# Patient Record
Sex: FEMALE | Race: WHITE | NOT HISPANIC OR LATINO | Employment: FULL TIME | URBAN - NONMETROPOLITAN AREA
[De-identification: names, ages, dates, MRNs, and addresses within clinical notes are randomized per-mention and may not be internally consistent; named-entity substitution may affect disease eponyms.]

---

## 2017-05-25 ENCOUNTER — LAB (OUTPATIENT)
Dept: LAB | Facility: HOSPITAL | Age: 52
End: 2017-05-25
Attending: INTERNAL MEDICINE

## 2017-05-25 ENCOUNTER — TRANSCRIBE ORDERS (OUTPATIENT)
Dept: ADMINISTRATIVE | Facility: HOSPITAL | Age: 52
End: 2017-05-25

## 2017-05-25 DIAGNOSIS — I10 HYPERTENSION WITH GOAL BLOOD PRESSURE LESS THAN 140/80: Primary | ICD-10-CM

## 2017-05-25 DIAGNOSIS — I10 HYPERTENSION WITH GOAL BLOOD PRESSURE LESS THAN 140/80: ICD-10-CM

## 2017-05-25 LAB
25(OH)D3 SERPL-MCNC: 26 NG/ML
ALBUMIN SERPL-MCNC: 4.2 G/DL (ref 3.5–5)
ALBUMIN/GLOB SERPL: 1.1 G/DL (ref 1.5–2.5)
ALP SERPL-CCNC: 80 U/L (ref 35–104)
ALT SERPL W P-5'-P-CCNC: 19 U/L (ref 10–36)
ANION GAP SERPL CALCULATED.3IONS-SCNC: 4.3 MMOL/L (ref 3.6–11.2)
AST SERPL-CCNC: 27 U/L (ref 10–30)
BASOPHILS # BLD AUTO: 0.03 10*3/MM3 (ref 0–0.3)
BASOPHILS NFR BLD AUTO: 0.4 % (ref 0–2)
BILIRUB SERPL-MCNC: 0.4 MG/DL (ref 0.2–1.8)
BUN BLD-MCNC: 14 MG/DL (ref 7–21)
BUN/CREAT SERPL: 19.4 (ref 7–25)
CALCIUM SPEC-SCNC: 9.6 MG/DL (ref 7.7–10)
CHLORIDE SERPL-SCNC: 104 MMOL/L (ref 99–112)
CHOLEST SERPL-MCNC: 160 MG/DL (ref 0–200)
CO2 SERPL-SCNC: 31.7 MMOL/L (ref 24.3–31.9)
CREAT BLD-MCNC: 0.72 MG/DL (ref 0.43–1.29)
DEPRECATED RDW RBC AUTO: 46 FL (ref 37–54)
EOSINOPHIL # BLD AUTO: 0.22 10*3/MM3 (ref 0–0.7)
EOSINOPHIL NFR BLD AUTO: 3.2 % (ref 0–5)
ERYTHROCYTE [DISTWIDTH] IN BLOOD BY AUTOMATED COUNT: 13.6 % (ref 11.5–14.5)
FOLATE SERPL-MCNC: 9.24 NG/ML (ref 5.4–20)
GFR SERPL CREATININE-BSD FRML MDRD: 85 ML/MIN/1.73
GLOBULIN UR ELPH-MCNC: 4 GM/DL
GLUCOSE BLD-MCNC: 96 MG/DL (ref 70–110)
HCT VFR BLD AUTO: 39.9 % (ref 37–47)
HDLC SERPL-MCNC: 52 MG/DL (ref 60–100)
HGB BLD-MCNC: 12.8 G/DL (ref 12–16)
IMM GRANULOCYTES # BLD: 0.02 10*3/MM3 (ref 0–0.03)
IMM GRANULOCYTES NFR BLD: 0.3 % (ref 0–0.5)
LDLC SERPL CALC-MCNC: 81 MG/DL (ref 0–100)
LDLC/HDLC SERPL: 1.57 {RATIO}
LYMPHOCYTES # BLD AUTO: 1.43 10*3/MM3 (ref 1–3)
LYMPHOCYTES NFR BLD AUTO: 20.6 % (ref 21–51)
MCH RBC QN AUTO: 29.8 PG (ref 27–33)
MCHC RBC AUTO-ENTMCNC: 32.1 G/DL (ref 33–37)
MCV RBC AUTO: 93 FL (ref 80–94)
MONOCYTES # BLD AUTO: 0.4 10*3/MM3 (ref 0.1–0.9)
MONOCYTES NFR BLD AUTO: 5.8 % (ref 0–10)
NEUTROPHILS # BLD AUTO: 4.84 10*3/MM3 (ref 1.4–6.5)
NEUTROPHILS NFR BLD AUTO: 69.7 % (ref 30–70)
OSMOLALITY SERPL CALC.SUM OF ELEC: 279.7 MOSM/KG (ref 273–305)
PLATELET # BLD AUTO: 291 10*3/MM3 (ref 130–400)
PMV BLD AUTO: 10.8 FL (ref 6–10)
POTASSIUM BLD-SCNC: 3.7 MMOL/L (ref 3.5–5.3)
PROT SERPL-MCNC: 8.2 G/DL (ref 6–8)
RBC # BLD AUTO: 4.29 10*6/MM3 (ref 4.2–5.4)
SODIUM BLD-SCNC: 140 MMOL/L (ref 135–153)
TRIGL SERPL-MCNC: 133 MG/DL (ref 0–150)
TSH SERPL DL<=0.05 MIU/L-ACNC: 1.92 MIU/ML (ref 0.55–4.78)
VIT B12 BLD-MCNC: 257 PG/ML (ref 211–911)
VLDLC SERPL-MCNC: 26.6 MG/DL
WBC NRBC COR # BLD: 6.94 10*3/MM3 (ref 4.5–12.5)

## 2017-05-25 PROCEDURE — 82306 VITAMIN D 25 HYDROXY: CPT | Performed by: INTERNAL MEDICINE

## 2017-05-25 PROCEDURE — 84443 ASSAY THYROID STIM HORMONE: CPT | Performed by: INTERNAL MEDICINE

## 2017-05-25 PROCEDURE — 82607 VITAMIN B-12: CPT | Performed by: INTERNAL MEDICINE

## 2017-05-25 PROCEDURE — 80053 COMPREHEN METABOLIC PANEL: CPT | Performed by: INTERNAL MEDICINE

## 2017-05-25 PROCEDURE — 85025 COMPLETE CBC W/AUTO DIFF WBC: CPT | Performed by: INTERNAL MEDICINE

## 2017-05-25 PROCEDURE — 80061 LIPID PANEL: CPT | Performed by: INTERNAL MEDICINE

## 2017-05-25 PROCEDURE — 36415 COLL VENOUS BLD VENIPUNCTURE: CPT

## 2017-05-25 PROCEDURE — 82746 ASSAY OF FOLIC ACID SERUM: CPT | Performed by: INTERNAL MEDICINE

## 2017-11-13 ENCOUNTER — LAB (OUTPATIENT)
Dept: LAB | Facility: HOSPITAL | Age: 52
End: 2017-11-13
Attending: INTERNAL MEDICINE

## 2017-11-13 ENCOUNTER — TRANSCRIBE ORDERS (OUTPATIENT)
Dept: ADMINISTRATIVE | Facility: HOSPITAL | Age: 52
End: 2017-11-13

## 2017-11-13 DIAGNOSIS — E78.5 HYPERLIPIDEMIA, UNSPECIFIED HYPERLIPIDEMIA TYPE: Primary | ICD-10-CM

## 2017-11-13 DIAGNOSIS — E78.5 HYPERLIPIDEMIA, UNSPECIFIED HYPERLIPIDEMIA TYPE: ICD-10-CM

## 2017-11-13 LAB
ALBUMIN SERPL-MCNC: 4.2 G/DL (ref 3.5–5)
ALBUMIN/GLOB SERPL: 1.2 G/DL (ref 1.5–2.5)
ALP SERPL-CCNC: 76 U/L (ref 35–104)
ALT SERPL W P-5'-P-CCNC: 28 U/L (ref 10–36)
ANION GAP SERPL CALCULATED.3IONS-SCNC: 4.2 MMOL/L (ref 3.6–11.2)
AST SERPL-CCNC: 29 U/L (ref 10–30)
BILIRUB SERPL-MCNC: 0.5 MG/DL (ref 0.2–1.8)
BUN BLD-MCNC: 12 MG/DL (ref 7–21)
BUN/CREAT SERPL: 17.1 (ref 7–25)
CALCIUM SPEC-SCNC: 9.3 MG/DL (ref 7.7–10)
CHLORIDE SERPL-SCNC: 105 MMOL/L (ref 99–112)
CHOLEST SERPL-MCNC: 136 MG/DL (ref 0–200)
CO2 SERPL-SCNC: 30.8 MMOL/L (ref 24.3–31.9)
CREAT BLD-MCNC: 0.7 MG/DL (ref 0.43–1.29)
GFR SERPL CREATININE-BSD FRML MDRD: 88 ML/MIN/1.73
GLOBULIN UR ELPH-MCNC: 3.5 GM/DL
GLUCOSE BLD-MCNC: 87 MG/DL (ref 70–110)
HDLC SERPL-MCNC: 49 MG/DL (ref 60–100)
LDLC SERPL CALC-MCNC: 62 MG/DL (ref 0–100)
LDLC/HDLC SERPL: 1.27 {RATIO}
OSMOLALITY SERPL CALC.SUM OF ELEC: 278.5 MOSM/KG (ref 273–305)
POTASSIUM BLD-SCNC: 4.3 MMOL/L (ref 3.5–5.3)
PROT SERPL-MCNC: 7.7 G/DL (ref 6–8)
SODIUM BLD-SCNC: 140 MMOL/L (ref 135–153)
TRIGL SERPL-MCNC: 124 MG/DL (ref 0–150)
VLDLC SERPL-MCNC: 24.8 MG/DL

## 2017-11-13 PROCEDURE — 80053 COMPREHEN METABOLIC PANEL: CPT | Performed by: INTERNAL MEDICINE

## 2017-11-13 PROCEDURE — 36415 COLL VENOUS BLD VENIPUNCTURE: CPT

## 2017-11-13 PROCEDURE — 80061 LIPID PANEL: CPT | Performed by: INTERNAL MEDICINE

## 2018-04-30 ENCOUNTER — LAB (OUTPATIENT)
Dept: LAB | Facility: HOSPITAL | Age: 53
End: 2018-04-30
Attending: INTERNAL MEDICINE

## 2018-04-30 ENCOUNTER — TRANSCRIBE ORDERS (OUTPATIENT)
Dept: GENERAL RADIOLOGY | Facility: HOSPITAL | Age: 53
End: 2018-04-30

## 2018-04-30 DIAGNOSIS — I10 HYPERTENSION WITH GOAL BLOOD PRESSURE LESS THAN 140/80: Primary | ICD-10-CM

## 2018-04-30 DIAGNOSIS — R01.1 CARDIAC MURMUR: ICD-10-CM

## 2018-04-30 DIAGNOSIS — E55.9 VITAMIN D DEFICIENCY: ICD-10-CM

## 2018-04-30 DIAGNOSIS — E78.5 DYSLIPIDEMIA, GOAL LDL BELOW 130: ICD-10-CM

## 2018-04-30 DIAGNOSIS — M87.00 AVN (AVASCULAR NECROSIS OF BONE) (HCC): ICD-10-CM

## 2018-04-30 DIAGNOSIS — I10 HYPERTENSION WITH GOAL BLOOD PRESSURE LESS THAN 140/80: ICD-10-CM

## 2018-04-30 LAB
25(OH)D3 SERPL-MCNC: 40 NG/ML
ALBUMIN SERPL-MCNC: 4.1 G/DL (ref 3.5–5)
ALBUMIN/GLOB SERPL: 1.2 G/DL (ref 1.5–2.5)
ALP SERPL-CCNC: 74 U/L (ref 35–104)
ALT SERPL W P-5'-P-CCNC: 18 U/L (ref 10–36)
ANION GAP SERPL CALCULATED.3IONS-SCNC: 4.6 MMOL/L (ref 3.6–11.2)
AST SERPL-CCNC: 23 U/L (ref 10–30)
BILIRUB SERPL-MCNC: 0.5 MG/DL (ref 0.2–1.8)
BUN BLD-MCNC: 16 MG/DL (ref 7–21)
BUN/CREAT SERPL: 23.5 (ref 7–25)
CALCIUM SPEC-SCNC: 9.3 MG/DL (ref 7.7–10)
CHLORIDE SERPL-SCNC: 106 MMOL/L (ref 99–112)
CHOLEST SERPL-MCNC: 127 MG/DL (ref 0–200)
CK SERPL-CCNC: 149 U/L (ref 24–173)
CO2 SERPL-SCNC: 30.4 MMOL/L (ref 24.3–31.9)
CREAT BLD-MCNC: 0.68 MG/DL (ref 0.43–1.29)
GFR SERPL CREATININE-BSD FRML MDRD: 91 ML/MIN/1.73
GLOBULIN UR ELPH-MCNC: 3.3 GM/DL
GLUCOSE BLD-MCNC: 94 MG/DL (ref 70–110)
HDLC SERPL-MCNC: 46 MG/DL (ref 60–100)
LDLC SERPL CALC-MCNC: 54 MG/DL (ref 0–100)
LDLC/HDLC SERPL: 1.17 {RATIO}
OSMOLALITY SERPL CALC.SUM OF ELEC: 282.2 MOSM/KG (ref 273–305)
POTASSIUM BLD-SCNC: 4.1 MMOL/L (ref 3.5–5.3)
PROT SERPL-MCNC: 7.4 G/DL (ref 6–8)
SODIUM BLD-SCNC: 141 MMOL/L (ref 135–153)
TRIGL SERPL-MCNC: 136 MG/DL (ref 0–150)
VLDLC SERPL-MCNC: 27.2 MG/DL

## 2018-04-30 PROCEDURE — 82550 ASSAY OF CK (CPK): CPT

## 2018-04-30 PROCEDURE — 36415 COLL VENOUS BLD VENIPUNCTURE: CPT

## 2018-04-30 PROCEDURE — 80061 LIPID PANEL: CPT

## 2018-04-30 PROCEDURE — 80053 COMPREHEN METABOLIC PANEL: CPT

## 2018-04-30 PROCEDURE — 82306 VITAMIN D 25 HYDROXY: CPT

## 2018-05-21 ENCOUNTER — HOSPITAL ENCOUNTER (OUTPATIENT)
Dept: MAMMOGRAPHY | Facility: HOSPITAL | Age: 53
Discharge: HOME | End: 2018-05-21
Admitting: INTERNAL MEDICINE

## 2018-05-21 DIAGNOSIS — Z12.39 BREAST CANCER SCREENING: ICD-10-CM

## 2018-05-21 PROCEDURE — 77067 SCR MAMMO BI INCL CAD: CPT

## 2018-05-21 PROCEDURE — 77063 BREAST TOMOSYNTHESIS BI: CPT

## 2018-05-21 PROCEDURE — 77067 SCR MAMMO BI INCL CAD: CPT | Performed by: RADIOLOGY

## 2018-05-21 PROCEDURE — 77063 BREAST TOMOSYNTHESIS BI: CPT | Performed by: RADIOLOGY

## 2018-11-06 ENCOUNTER — LAB (OUTPATIENT)
Dept: LAB | Facility: HOSPITAL | Age: 53
End: 2018-11-06
Attending: INTERNAL MEDICINE

## 2018-11-06 ENCOUNTER — TRANSCRIBE ORDERS (OUTPATIENT)
Dept: ADMINISTRATIVE | Facility: HOSPITAL | Age: 53
End: 2018-11-06

## 2018-11-06 DIAGNOSIS — E78.5 HYPERLIPIDEMIA, UNSPECIFIED HYPERLIPIDEMIA TYPE: ICD-10-CM

## 2018-11-06 DIAGNOSIS — E53.8 VITAMIN B 12 DEFICIENCY: ICD-10-CM

## 2018-11-06 DIAGNOSIS — R53.83 MALAISE AND FATIGUE: ICD-10-CM

## 2018-11-06 DIAGNOSIS — I10 BENIGN HYPERTENSION: ICD-10-CM

## 2018-11-06 DIAGNOSIS — R53.81 MALAISE AND FATIGUE: ICD-10-CM

## 2018-11-06 DIAGNOSIS — I10 BENIGN HYPERTENSION: Primary | ICD-10-CM

## 2018-11-06 LAB
ALBUMIN SERPL-MCNC: 4.3 G/DL (ref 3.5–5)
ALBUMIN/GLOB SERPL: 1.3 G/DL (ref 1.5–2.5)
ALP SERPL-CCNC: 75 U/L (ref 35–104)
ALT SERPL W P-5'-P-CCNC: 21 U/L (ref 10–36)
ANION GAP SERPL CALCULATED.3IONS-SCNC: 5.1 MMOL/L (ref 3.6–11.2)
AST SERPL-CCNC: 21 U/L (ref 10–30)
BASOPHILS # BLD AUTO: 0.04 10*3/MM3 (ref 0–0.3)
BASOPHILS NFR BLD AUTO: 0.5 % (ref 0–2)
BILIRUB SERPL-MCNC: 0.5 MG/DL (ref 0.2–1.8)
BUN BLD-MCNC: 13 MG/DL (ref 7–21)
BUN/CREAT SERPL: 19.1 (ref 7–25)
CALCIUM SPEC-SCNC: 9.2 MG/DL (ref 7.7–10)
CHLORIDE SERPL-SCNC: 105 MMOL/L (ref 99–112)
CHOLEST SERPL-MCNC: 137 MG/DL (ref 0–200)
CO2 SERPL-SCNC: 28.9 MMOL/L (ref 24.3–31.9)
CREAT BLD-MCNC: 0.68 MG/DL (ref 0.43–1.29)
DEPRECATED RDW RBC AUTO: 45.1 FL (ref 37–54)
EOSINOPHIL # BLD AUTO: 0.32 10*3/MM3 (ref 0–0.7)
EOSINOPHIL NFR BLD AUTO: 3.8 % (ref 0–5)
ERYTHROCYTE [DISTWIDTH] IN BLOOD BY AUTOMATED COUNT: 13.7 % (ref 11.5–14.5)
FOLATE SERPL-MCNC: 14.67 NG/ML (ref 5.4–20)
GFR SERPL CREATININE-BSD FRML MDRD: 90 ML/MIN/1.73
GLOBULIN UR ELPH-MCNC: 3.3 GM/DL
GLUCOSE BLD-MCNC: 84 MG/DL (ref 70–110)
HCT VFR BLD AUTO: 41.5 % (ref 37–47)
HDLC SERPL-MCNC: 52 MG/DL (ref 60–100)
HGB BLD-MCNC: 13.1 G/DL (ref 12–16)
IMM GRANULOCYTES # BLD: 0.02 10*3/MM3 (ref 0–0.03)
IMM GRANULOCYTES NFR BLD: 0.2 % (ref 0–0.5)
LDLC SERPL CALC-MCNC: 60 MG/DL (ref 0–100)
LDLC/HDLC SERPL: 1.15 {RATIO}
LYMPHOCYTES # BLD AUTO: 1.48 10*3/MM3 (ref 1–3)
LYMPHOCYTES NFR BLD AUTO: 17.8 % (ref 21–51)
MCH RBC QN AUTO: 29.8 PG (ref 27–33)
MCHC RBC AUTO-ENTMCNC: 31.6 G/DL (ref 33–37)
MCV RBC AUTO: 94.3 FL (ref 80–94)
MONOCYTES # BLD AUTO: 0.35 10*3/MM3 (ref 0.1–0.9)
MONOCYTES NFR BLD AUTO: 4.2 % (ref 0–10)
NEUTROPHILS # BLD AUTO: 6.12 10*3/MM3 (ref 1.4–6.5)
NEUTROPHILS NFR BLD AUTO: 73.5 % (ref 30–70)
OSMOLALITY SERPL CALC.SUM OF ELEC: 276.8 MOSM/KG (ref 273–305)
PLATELET # BLD AUTO: 299 10*3/MM3 (ref 130–400)
PMV BLD AUTO: 10.3 FL (ref 6–10)
POTASSIUM BLD-SCNC: 3.7 MMOL/L (ref 3.5–5.3)
PROT SERPL-MCNC: 7.6 G/DL (ref 6–8)
RBC # BLD AUTO: 4.4 10*6/MM3 (ref 4.2–5.4)
SODIUM BLD-SCNC: 139 MMOL/L (ref 135–153)
TRIGL SERPL-MCNC: 127 MG/DL (ref 0–150)
TSH SERPL DL<=0.05 MIU/L-ACNC: 1.15 MIU/ML (ref 0.55–4.78)
VIT B12 BLD-MCNC: 331 PG/ML (ref 211–911)
VLDLC SERPL-MCNC: 25.4 MG/DL
WBC NRBC COR # BLD: 8.33 10*3/MM3 (ref 4.5–12.5)

## 2018-11-06 PROCEDURE — 80061 LIPID PANEL: CPT

## 2018-11-06 PROCEDURE — 36415 COLL VENOUS BLD VENIPUNCTURE: CPT

## 2018-11-06 PROCEDURE — 85025 COMPLETE CBC W/AUTO DIFF WBC: CPT

## 2018-11-06 PROCEDURE — 84443 ASSAY THYROID STIM HORMONE: CPT

## 2018-11-06 PROCEDURE — 82607 VITAMIN B-12: CPT

## 2018-11-06 PROCEDURE — 80053 COMPREHEN METABOLIC PANEL: CPT

## 2018-11-06 PROCEDURE — 82746 ASSAY OF FOLIC ACID SERUM: CPT

## 2019-12-16 ENCOUNTER — HOSPITAL ENCOUNTER (OUTPATIENT)
Dept: MAMMOGRAPHY | Facility: HOSPITAL | Age: 54
Discharge: HOME | End: 2019-12-16
Admitting: INTERNAL MEDICINE

## 2019-12-16 DIAGNOSIS — Z12.31 VISIT FOR SCREENING MAMMOGRAM: ICD-10-CM

## 2019-12-16 PROCEDURE — 77067 SCR MAMMO BI INCL CAD: CPT

## 2019-12-16 PROCEDURE — 77067 SCR MAMMO BI INCL CAD: CPT | Performed by: RADIOLOGY

## 2019-12-16 PROCEDURE — 77063 BREAST TOMOSYNTHESIS BI: CPT

## 2019-12-16 PROCEDURE — 77063 BREAST TOMOSYNTHESIS BI: CPT | Performed by: RADIOLOGY

## 2020-01-14 ENCOUNTER — HOSPITAL ENCOUNTER (OUTPATIENT)
Dept: MRI IMAGING | Facility: HOSPITAL | Age: 55
Discharge: HOME | End: 2020-01-14

## 2020-01-14 ENCOUNTER — TRANSCRIBE ORDERS (OUTPATIENT)
Dept: ADMINISTRATIVE | Facility: HOSPITAL | Age: 55
End: 2020-01-14

## 2020-01-14 DIAGNOSIS — M25.551 RIGHT HIP PAIN: ICD-10-CM

## 2020-01-14 DIAGNOSIS — M25.551 RIGHT HIP PAIN: Primary | ICD-10-CM

## 2020-02-04 ENCOUNTER — HOSPITAL ENCOUNTER (OUTPATIENT)
Dept: GENERAL RADIOLOGY | Facility: HOSPITAL | Age: 55
Discharge: HOME | End: 2020-02-04
Admitting: ORTHOPAEDIC SURGERY

## 2020-02-04 ENCOUNTER — APPOINTMENT (OUTPATIENT)
Dept: PREADMISSION TESTING | Facility: HOSPITAL | Age: 55
End: 2020-02-04

## 2020-02-04 VITALS — HEIGHT: 67 IN | BODY MASS INDEX: 45.99 KG/M2 | WEIGHT: 293 LBS

## 2020-02-04 LAB
ABO GROUP BLD: NORMAL
ALBUMIN SERPL-MCNC: 4.5 G/DL (ref 3.5–5.2)
ALBUMIN/GLOB SERPL: 1.3 G/DL
ALP SERPL-CCNC: 98 U/L (ref 39–117)
ALT SERPL W P-5'-P-CCNC: 17 U/L (ref 1–33)
ANION GAP SERPL CALCULATED.3IONS-SCNC: 14 MMOL/L (ref 5–15)
AST SERPL-CCNC: 19 U/L (ref 1–32)
BACTERIA UR QL AUTO: NORMAL /HPF
BASOPHILS # BLD AUTO: 0.06 10*3/MM3 (ref 0–0.2)
BASOPHILS NFR BLD AUTO: 0.5 % (ref 0–1.5)
BILIRUB SERPL-MCNC: 0.4 MG/DL (ref 0.2–1.2)
BILIRUB UR QL STRIP: NEGATIVE
BLD GP AB SCN SERPL QL: NEGATIVE
BUN BLD-MCNC: 20 MG/DL (ref 6–20)
BUN/CREAT SERPL: 25.6 (ref 7–25)
CALCIUM SPEC-SCNC: 9.4 MG/DL (ref 8.6–10.5)
CHLORIDE SERPL-SCNC: 99 MMOL/L (ref 98–107)
CLARITY UR: CLEAR
CO2 SERPL-SCNC: 27 MMOL/L (ref 22–29)
COLOR UR: YELLOW
CREAT BLD-MCNC: 0.78 MG/DL (ref 0.57–1)
CRP SERPL-MCNC: 0.7 MG/DL (ref 0–0.5)
DEPRECATED RDW RBC AUTO: 48.9 FL (ref 37–54)
EOSINOPHIL # BLD AUTO: 0.27 10*3/MM3 (ref 0–0.4)
EOSINOPHIL NFR BLD AUTO: 2.1 % (ref 0.3–6.2)
ERYTHROCYTE [DISTWIDTH] IN BLOOD BY AUTOMATED COUNT: 13.4 % (ref 12.3–15.4)
ERYTHROCYTE [SEDIMENTATION RATE] IN BLOOD: 36 MM/HR (ref 0–30)
GFR SERPL CREATININE-BSD FRML MDRD: 77 ML/MIN/1.73
GLOBULIN UR ELPH-MCNC: 3.6 GM/DL
GLUCOSE BLD-MCNC: 89 MG/DL (ref 65–99)
GLUCOSE UR STRIP-MCNC: NEGATIVE MG/DL
HBA1C MFR BLD: 5.7 % (ref 4.8–5.6)
HCT VFR BLD AUTO: 39.4 % (ref 34–46.6)
HGB BLD-MCNC: 12.7 G/DL (ref 12–15.9)
HGB UR QL STRIP.AUTO: NEGATIVE
HYALINE CASTS UR QL AUTO: NORMAL /LPF
IMM GRANULOCYTES # BLD AUTO: 0.07 10*3/MM3 (ref 0–0.05)
IMM GRANULOCYTES NFR BLD AUTO: 0.5 % (ref 0–0.5)
KETONES UR QL STRIP: NEGATIVE
LEUKOCYTE ESTERASE UR QL STRIP.AUTO: NEGATIVE
LYMPHOCYTES # BLD AUTO: 1.93 10*3/MM3 (ref 0.7–3.1)
LYMPHOCYTES NFR BLD AUTO: 15.2 % (ref 19.6–45.3)
MCH RBC QN AUTO: 31.4 PG (ref 26.6–33)
MCHC RBC AUTO-ENTMCNC: 32.2 G/DL (ref 31.5–35.7)
MCV RBC AUTO: 97.5 FL (ref 79–97)
MONOCYTES # BLD AUTO: 0.71 10*3/MM3 (ref 0.1–0.9)
MONOCYTES NFR BLD AUTO: 5.6 % (ref 5–12)
NEUTROPHILS # BLD AUTO: 9.69 10*3/MM3 (ref 1.7–7)
NEUTROPHILS NFR BLD AUTO: 76.1 % (ref 42.7–76)
NITRITE UR QL STRIP: NEGATIVE
NRBC BLD AUTO-RTO: 0 /100 WBC (ref 0–0.2)
PH UR STRIP.AUTO: 5.5 [PH] (ref 5–8)
PLATELET # BLD AUTO: 357 10*3/MM3 (ref 140–450)
PMV BLD AUTO: 9.8 FL (ref 6–12)
POTASSIUM BLD-SCNC: 3.8 MMOL/L (ref 3.5–5.2)
PROT SERPL-MCNC: 8.1 G/DL (ref 6–8.5)
PROT UR QL STRIP: NEGATIVE
RBC # BLD AUTO: 4.04 10*6/MM3 (ref 3.77–5.28)
RBC # UR: NORMAL /HPF
REF LAB TEST METHOD: NORMAL
RH BLD: POSITIVE
SODIUM BLD-SCNC: 140 MMOL/L (ref 136–145)
SP GR UR STRIP: 1.02 (ref 1–1.03)
SQUAMOUS #/AREA URNS HPF: NORMAL /HPF
UROBILINOGEN UR QL STRIP: NORMAL
WBC NRBC COR # BLD: 12.73 10*3/MM3 (ref 3.4–10.8)
WBC UR QL AUTO: NORMAL /HPF

## 2020-02-04 PROCEDURE — 83036 HEMOGLOBIN GLYCOSYLATED A1C: CPT | Performed by: ORTHOPAEDIC SURGERY

## 2020-02-04 PROCEDURE — 36415 COLL VENOUS BLD VENIPUNCTURE: CPT

## 2020-02-04 PROCEDURE — 86850 RBC ANTIBODY SCREEN: CPT | Performed by: ORTHOPAEDIC SURGERY

## 2020-02-04 PROCEDURE — 86140 C-REACTIVE PROTEIN: CPT | Performed by: ORTHOPAEDIC SURGERY

## 2020-02-04 PROCEDURE — 71046 X-RAY EXAM CHEST 2 VIEWS: CPT

## 2020-02-04 PROCEDURE — 86901 BLOOD TYPING SEROLOGIC RH(D): CPT | Performed by: ORTHOPAEDIC SURGERY

## 2020-02-04 PROCEDURE — 81001 URINALYSIS AUTO W/SCOPE: CPT | Performed by: ORTHOPAEDIC SURGERY

## 2020-02-04 PROCEDURE — 80053 COMPREHEN METABOLIC PANEL: CPT | Performed by: ORTHOPAEDIC SURGERY

## 2020-02-04 PROCEDURE — 93005 ELECTROCARDIOGRAM TRACING: CPT

## 2020-02-04 PROCEDURE — 85652 RBC SED RATE AUTOMATED: CPT | Performed by: ORTHOPAEDIC SURGERY

## 2020-02-04 PROCEDURE — 93010 ELECTROCARDIOGRAM REPORT: CPT | Performed by: INTERNAL MEDICINE

## 2020-02-04 PROCEDURE — 85025 COMPLETE CBC W/AUTO DIFF WBC: CPT | Performed by: ORTHOPAEDIC SURGERY

## 2020-02-04 PROCEDURE — 86900 BLOOD TYPING SEROLOGIC ABO: CPT | Performed by: ORTHOPAEDIC SURGERY

## 2020-02-04 RX ORDER — ACETAMINOPHEN 500 MG
500 TABLET ORAL EVERY 6 HOURS PRN
COMMUNITY

## 2020-02-04 RX ORDER — GABAPENTIN 400 MG/1
400 CAPSULE ORAL AS NEEDED
COMMUNITY

## 2020-02-04 RX ORDER — ASPIRIN 81 MG/1
81 TABLET ORAL DAILY
Status: ON HOLD | COMMUNITY
End: 2020-02-13 | Stop reason: SDUPTHER

## 2020-02-04 RX ORDER — HYDROCODONE BITARTRATE AND ACETAMINOPHEN 7.5; 325 MG/1; MG/1
1 TABLET ORAL EVERY 6 HOURS PRN
COMMUNITY
End: 2020-02-13 | Stop reason: HOSPADM

## 2020-02-04 ASSESSMENT — HOOS JR
HOOS JR SCORE: 36.363
HOOS JR SCORE: 17

## 2020-02-04 NOTE — PAT
The following information and instructions were given:    Do not eat, drink, smoke or chew gum after midnight the night before surgery. This also includes no mints.  Take all routine, prescribed medications including heart and blood pressure medicines with a sip of water unless otherwise instructed by your physician.   Do NOT take diabetic medication unless instructed by your physician.    DO NOT shave for two days before your procedure.  Do not wear makeup.      DO NOT wear fingernail polish (gel/regular) and/or acrylic/artificial nails on the day of surgery.   If you have had a recent manicure and would rather not remove polish or artificial nails, then the minimum requirement is that the polish/artificial nails must be removed from the middle finger on each hand.      If you are having surgery/procedure on an upper extremity, then fingernail polish/artificial fingernails must be removed for surgery.  NO EXCEPTIONS.      If you are having surgery/procedure on a lower extremity, then toenail polish on both extremities must be removed for surgery.  NO EXCEPTIONS.    Remove all jewelry (advise to go to jeweler if unable to remove).  Jewelry, especially rings, can no longer be taped for surgery.    Leave anything you consider valuable at home.    Leave your suitcase in the car until after your surgery.    Bring the following with you the day of your procedure (when applicable)       -picture ID and insurance cards       -Co-pay/deductible required by insurance       -Medications in the original bottles (not a list) including all over-the-counter medications if not brought to PAT       -Copy of advance directive, living will or power of  documents if not brought to PAT       -CPAP or BIPAP mask and tubing (do not bring machine)       -Skin prep instruction(s) sheet       -PAT Pass    Education booklet, brochure, handout or binder related to procedure given to patient.  Education booklet also includes general  information related to their recovery that mentions signs and symptoms of infection and when to call the doctor.    When applicable, an ERAS booklet was given to patient.    Pain Control After Surgery handout given to patient.    Respirex use (handout given to patient) and pneumonia prevention education provided.    Signs and Symptoms of infection were discussed with patient in Pre Admission testing.  Patient instructed to call their doctor if any of the following symptoms are noted during recovery:  fever of 100.4 F or higher, incision that is warm or has increasing bleeding, redness, or drainage.    DVT Prevention instructions given verbally during Pre Admission Testing appointment that stressed the importance of ambulation to improve blood circulation.  Also encouraged patient to perform foot exercises when in bed and that the application of a sequential device might be applied to lower extremities to improve circulation.      Please apply Chlorhexadine wipes to surgical area (if instructed) the night before procedure and the AM of procedure and document date/time of applications on skin prep instruction sheet.        Patient instructed to drink 20 ounces (or until full) of Gatorade and it needs to be completed 1 hour before given arrival time for procedure (NO RED Gatorade)    Patient verbalized understanding.        PATIENT DID NOT ATTEND JOINT REPLACEMENT CLASS

## 2020-02-11 ENCOUNTER — ANESTHESIA EVENT (OUTPATIENT)
Dept: PERIOP | Facility: HOSPITAL | Age: 55
End: 2020-02-11

## 2020-02-11 RX ORDER — FAMOTIDINE 10 MG/ML
20 INJECTION, SOLUTION INTRAVENOUS ONCE
Status: CANCELLED | OUTPATIENT
Start: 2020-02-11 | End: 2020-02-11

## 2020-02-12 ENCOUNTER — HOSPITAL ENCOUNTER (INPATIENT)
Facility: HOSPITAL | Age: 55
LOS: 1 days | Discharge: HOME | End: 2020-02-13
Attending: ORTHOPAEDIC SURGERY | Admitting: ORTHOPAEDIC SURGERY

## 2020-02-12 ENCOUNTER — APPOINTMENT (OUTPATIENT)
Dept: GENERAL RADIOLOGY | Facility: HOSPITAL | Age: 55
End: 2020-02-12

## 2020-02-12 ENCOUNTER — ANESTHESIA (OUTPATIENT)
Dept: PERIOP | Facility: HOSPITAL | Age: 55
End: 2020-02-12

## 2020-02-12 DIAGNOSIS — Z78.9 IMPAIRED MOBILITY AND ADLS: Primary | ICD-10-CM

## 2020-02-12 DIAGNOSIS — M16.11 ARTHRITIS OF RIGHT HIP: ICD-10-CM

## 2020-02-12 DIAGNOSIS — Z74.09 IMPAIRED FUNCTIONAL MOBILITY, BALANCE, GAIT, AND ENDURANCE: ICD-10-CM

## 2020-02-12 DIAGNOSIS — Z74.09 IMPAIRED MOBILITY AND ADLS: Primary | ICD-10-CM

## 2020-02-12 DIAGNOSIS — Z96.641 STATUS POST TOTAL REPLACEMENT OF RIGHT HIP: ICD-10-CM

## 2020-02-12 PROBLEM — R73.03 PREDIABETES: Status: ACTIVE | Noted: 2020-02-12

## 2020-02-12 PROBLEM — E66.9 OBESITY: Status: ACTIVE | Noted: 2020-02-12

## 2020-02-12 PROBLEM — I10 HYPERTENSION: Status: ACTIVE | Noted: 2020-02-12

## 2020-02-12 PROBLEM — E78.5 HYPERLIPIDEMIA: Status: ACTIVE | Noted: 2020-02-12

## 2020-02-12 LAB
ABO GROUP BLD: NORMAL
B-HCG UR QL: NEGATIVE
BLD GP AB SCN SERPL QL: NEGATIVE
GLUCOSE BLDC GLUCOMTR-MCNC: 135 MG/DL (ref 70–130)
GLUCOSE BLDC GLUCOMTR-MCNC: 154 MG/DL (ref 70–130)
INTERNAL NEGATIVE CONTROL: NORMAL
INTERNAL POSITIVE CONTROL: REACTIVE
Lab: NORMAL
POTASSIUM BLD-SCNC: 3.6 MMOL/L (ref 3.5–5.2)
RH BLD: POSITIVE
T&S EXPIRATION DATE: NORMAL

## 2020-02-12 PROCEDURE — 84132 ASSAY OF SERUM POTASSIUM: CPT | Performed by: ANESTHESIOLOGY

## 2020-02-12 PROCEDURE — C1776 JOINT DEVICE (IMPLANTABLE): HCPCS | Performed by: ORTHOPAEDIC SURGERY

## 2020-02-12 PROCEDURE — 97162 PT EVAL MOD COMPLEX 30 MIN: CPT

## 2020-02-12 PROCEDURE — 76000 FLUOROSCOPY <1 HR PHYS/QHP: CPT

## 2020-02-12 PROCEDURE — 25010000002 ONDANSETRON PER 1 MG: Performed by: NURSE PRACTITIONER

## 2020-02-12 PROCEDURE — 86901 BLOOD TYPING SEROLOGIC RH(D): CPT | Performed by: ORTHOPAEDIC SURGERY

## 2020-02-12 PROCEDURE — 82962 GLUCOSE BLOOD TEST: CPT

## 2020-02-12 PROCEDURE — 97116 GAIT TRAINING THERAPY: CPT

## 2020-02-12 PROCEDURE — C1713 ANCHOR/SCREW BN/BN,TIS/BN: HCPCS | Performed by: ORTHOPAEDIC SURGERY

## 2020-02-12 PROCEDURE — 0SR906A REPLACEMENT OF RIGHT HIP JOINT WITH OXIDIZED ZIRCONIUM ON POLYETHYLENE SYNTHETIC SUBSTITUTE, UNCEMENTED, OPEN APPROACH: ICD-10-PCS | Performed by: ORTHOPAEDIC SURGERY

## 2020-02-12 PROCEDURE — 25010000002 HYDROMORPHONE PER 4 MG: Performed by: ANESTHESIOLOGY

## 2020-02-12 PROCEDURE — 25010000002 ONDANSETRON PER 1 MG: Performed by: NURSE ANESTHETIST, CERTIFIED REGISTERED

## 2020-02-12 PROCEDURE — 25010000002 PROCHLORPERAZINE 10 MG/2ML SOLUTION: Performed by: INTERNAL MEDICINE

## 2020-02-12 PROCEDURE — 81025 URINE PREGNANCY TEST: CPT | Performed by: ANESTHESIOLOGY

## 2020-02-12 PROCEDURE — 25010000002 PROPOFOL 10 MG/ML EMULSION: Performed by: NURSE ANESTHETIST, CERTIFIED REGISTERED

## 2020-02-12 PROCEDURE — 86923 COMPATIBILITY TEST ELECTRIC: CPT

## 2020-02-12 PROCEDURE — 86850 RBC ANTIBODY SCREEN: CPT | Performed by: ORTHOPAEDIC SURGERY

## 2020-02-12 PROCEDURE — 25010000002 FENTANYL CITRATE (PF) 100 MCG/2ML SOLUTION: Performed by: NURSE ANESTHETIST, CERTIFIED REGISTERED

## 2020-02-12 PROCEDURE — 25010000002 CEFAZOLIN PER 500 MG: Performed by: ORTHOPAEDIC SURGERY

## 2020-02-12 PROCEDURE — 73502 X-RAY EXAM HIP UNI 2-3 VIEWS: CPT

## 2020-02-12 PROCEDURE — 63710000001 INSULIN LISPRO (HUMAN) PER 5 UNITS: Performed by: NURSE PRACTITIONER

## 2020-02-12 PROCEDURE — 86900 BLOOD TYPING SEROLOGIC ABO: CPT | Performed by: ORTHOPAEDIC SURGERY

## 2020-02-12 DEVICE — IMPLANTABLE DEVICE: Type: IMPLANTABLE DEVICE | Site: HIP | Status: FUNCTIONAL

## 2020-02-12 DEVICE — REFLECTION SPHERICAL HEAD SCREW 20MM
Type: IMPLANTABLE DEVICE | Site: HIP | Status: FUNCTIONAL
Brand: REFLECTION

## 2020-02-12 DEVICE — POLARSTEM COLLAR STANDARD                                    NON-CEMENTED WITH TI/HA 3
Type: IMPLANTABLE DEVICE | Site: HIP | Status: FUNCTIONAL
Brand: POLARSTEM

## 2020-02-12 DEVICE — R3 3 HOLE ACETABULAR SHELL 52MM
Type: IMPLANTABLE DEVICE | Site: HIP | Status: FUNCTIONAL
Brand: R3 ACETABULAR

## 2020-02-12 DEVICE — R3 0 DEGREE XLPE ACETABULAR LINER                                    36MM INNER DIAMETER X OUTER DIAMETER 52MM
Type: IMPLANTABLE DEVICE | Site: HIP | Status: FUNCTIONAL
Brand: R3

## 2020-02-12 DEVICE — OXINIUM FEMORAL HEAD 12/14 TAPER                                    36 MM +0
Type: IMPLANTABLE DEVICE | Site: HIP | Status: FUNCTIONAL
Brand: OXINIUM

## 2020-02-12 RX ORDER — CEFAZOLIN SODIUM IN 0.9 % NACL 3 G/100 ML
3 INTRAVENOUS SOLUTION, PIGGYBACK (ML) INTRAVENOUS ONCE
Status: COMPLETED | OUTPATIENT
Start: 2020-02-12 | End: 2020-02-12

## 2020-02-12 RX ORDER — VALSARTAN 160 MG/1
320 TABLET ORAL
Status: DISCONTINUED | OUTPATIENT
Start: 2020-02-12 | End: 2020-02-13 | Stop reason: HOSPADM

## 2020-02-12 RX ORDER — LIDOCAINE HYDROCHLORIDE 10 MG/ML
0.5 INJECTION, SOLUTION EPIDURAL; INFILTRATION; INTRACAUDAL; PERINEURAL ONCE AS NEEDED
Status: COMPLETED | OUTPATIENT
Start: 2020-02-12 | End: 2020-02-12

## 2020-02-12 RX ORDER — SODIUM CHLORIDE, SODIUM LACTATE, POTASSIUM CHLORIDE, CALCIUM CHLORIDE 600; 310; 30; 20 MG/100ML; MG/100ML; MG/100ML; MG/100ML
9 INJECTION, SOLUTION INTRAVENOUS CONTINUOUS
Status: DISCONTINUED | OUTPATIENT
Start: 2020-02-12 | End: 2020-02-13 | Stop reason: HOSPADM

## 2020-02-12 RX ORDER — HYDROMORPHONE HYDROCHLORIDE 1 MG/ML
0.5 INJECTION, SOLUTION INTRAMUSCULAR; INTRAVENOUS; SUBCUTANEOUS
Status: COMPLETED | OUTPATIENT
Start: 2020-02-12 | End: 2020-02-12

## 2020-02-12 RX ORDER — DEXTROSE MONOHYDRATE 25 G/50ML
25 INJECTION, SOLUTION INTRAVENOUS
Status: DISCONTINUED | OUTPATIENT
Start: 2020-02-12 | End: 2020-02-13 | Stop reason: HOSPADM

## 2020-02-12 RX ORDER — BISACODYL 10 MG
10 SUPPOSITORY, RECTAL RECTAL DAILY PRN
Status: DISCONTINUED | OUTPATIENT
Start: 2020-02-12 | End: 2020-02-13 | Stop reason: HOSPADM

## 2020-02-12 RX ORDER — PREGABALIN 75 MG/1
75 CAPSULE ORAL ONCE
Status: COMPLETED | OUTPATIENT
Start: 2020-02-12 | End: 2020-02-12

## 2020-02-12 RX ORDER — CEFAZOLIN SODIUM IN 0.9 % NACL 3 G/100 ML
3 INTRAVENOUS SOLUTION, PIGGYBACK (ML) INTRAVENOUS EVERY 8 HOURS
Status: COMPLETED | OUTPATIENT
Start: 2020-02-12 | End: 2020-02-13

## 2020-02-12 RX ORDER — FAMOTIDINE 20 MG/1
20 TABLET, FILM COATED ORAL ONCE
Status: COMPLETED | OUTPATIENT
Start: 2020-02-12 | End: 2020-02-12

## 2020-02-12 RX ORDER — BUPIVACAINE HYDROCHLORIDE 5 MG/ML
INJECTION, SOLUTION PERINEURAL
Status: COMPLETED | OUTPATIENT
Start: 2020-02-12 | End: 2020-02-12

## 2020-02-12 RX ORDER — HYDROCODONE BITARTRATE AND ACETAMINOPHEN 5; 325 MG/1; MG/1
1 TABLET ORAL EVERY 4 HOURS PRN
Status: DISCONTINUED | OUTPATIENT
Start: 2020-02-12 | End: 2020-02-13 | Stop reason: HOSPADM

## 2020-02-12 RX ORDER — LABETALOL HYDROCHLORIDE 5 MG/ML
10 INJECTION, SOLUTION INTRAVENOUS EVERY 4 HOURS PRN
Status: DISCONTINUED | OUTPATIENT
Start: 2020-02-12 | End: 2020-02-13 | Stop reason: HOSPADM

## 2020-02-12 RX ORDER — PROCHLORPERAZINE 25 MG
25 SUPPOSITORY, RECTAL RECTAL EVERY 12 HOURS PRN
Status: DISCONTINUED | OUTPATIENT
Start: 2020-02-12 | End: 2020-02-13 | Stop reason: HOSPADM

## 2020-02-12 RX ORDER — NICOTINE POLACRILEX 4 MG
15 LOZENGE BUCCAL
Status: DISCONTINUED | OUTPATIENT
Start: 2020-02-12 | End: 2020-02-13 | Stop reason: HOSPADM

## 2020-02-12 RX ORDER — PROMETHAZINE HYDROCHLORIDE 12.5 MG/1
12.5 TABLET ORAL EVERY 6 HOURS PRN
Status: DISCONTINUED | OUTPATIENT
Start: 2020-02-12 | End: 2020-02-13 | Stop reason: HOSPADM

## 2020-02-12 RX ORDER — SODIUM CHLORIDE 9 MG/ML
150 INJECTION, SOLUTION INTRAVENOUS CONTINUOUS
Status: DISCONTINUED | OUTPATIENT
Start: 2020-02-12 | End: 2020-02-13 | Stop reason: HOSPADM

## 2020-02-12 RX ORDER — ASPIRIN 325 MG
325 TABLET ORAL DAILY
Status: DISCONTINUED | OUTPATIENT
Start: 2020-02-13 | End: 2020-02-13 | Stop reason: HOSPADM

## 2020-02-12 RX ORDER — ONDANSETRON 2 MG/ML
INJECTION INTRAMUSCULAR; INTRAVENOUS AS NEEDED
Status: DISCONTINUED | OUTPATIENT
Start: 2020-02-12 | End: 2020-02-12 | Stop reason: SURG

## 2020-02-12 RX ORDER — FENTANYL CITRATE 50 UG/ML
50 INJECTION, SOLUTION INTRAMUSCULAR; INTRAVENOUS
Status: DISCONTINUED | OUTPATIENT
Start: 2020-02-12 | End: 2020-02-12 | Stop reason: HOSPADM

## 2020-02-12 RX ORDER — PROPOFOL 10 MG/ML
VIAL (ML) INTRAVENOUS AS NEEDED
Status: DISCONTINUED | OUTPATIENT
Start: 2020-02-12 | End: 2020-02-12 | Stop reason: SURG

## 2020-02-12 RX ORDER — BISACODYL 5 MG/1
10 TABLET, DELAYED RELEASE ORAL DAILY PRN
Status: DISCONTINUED | OUTPATIENT
Start: 2020-02-12 | End: 2020-02-13 | Stop reason: HOSPADM

## 2020-02-12 RX ORDER — HYDROMORPHONE HYDROCHLORIDE 1 MG/ML
0.5 INJECTION, SOLUTION INTRAMUSCULAR; INTRAVENOUS; SUBCUTANEOUS
Status: DISCONTINUED | OUTPATIENT
Start: 2020-02-12 | End: 2020-02-13 | Stop reason: HOSPADM

## 2020-02-12 RX ORDER — SODIUM CHLORIDE 0.9 % (FLUSH) 0.9 %
10 SYRINGE (ML) INJECTION EVERY 12 HOURS SCHEDULED
Status: DISCONTINUED | OUTPATIENT
Start: 2020-02-12 | End: 2020-02-12 | Stop reason: HOSPADM

## 2020-02-12 RX ORDER — KETOROLAC TROMETHAMINE 15 MG/ML
15 INJECTION, SOLUTION INTRAMUSCULAR; INTRAVENOUS EVERY 6 HOURS PRN
Status: DISCONTINUED | OUTPATIENT
Start: 2020-02-12 | End: 2020-02-13 | Stop reason: HOSPADM

## 2020-02-12 RX ORDER — AMLODIPINE BESYLATE 5 MG/1
5 TABLET ORAL DAILY
Status: DISCONTINUED | OUTPATIENT
Start: 2020-02-13 | End: 2020-02-13 | Stop reason: HOSPADM

## 2020-02-12 RX ORDER — ROSUVASTATIN CALCIUM 20 MG/1
20 TABLET, COATED ORAL DAILY
Status: DISCONTINUED | OUTPATIENT
Start: 2020-02-13 | End: 2020-02-13 | Stop reason: HOSPADM

## 2020-02-12 RX ORDER — SODIUM CHLORIDE 0.9 % (FLUSH) 0.9 %
10 SYRINGE (ML) INJECTION AS NEEDED
Status: DISCONTINUED | OUTPATIENT
Start: 2020-02-12 | End: 2020-02-12 | Stop reason: HOSPADM

## 2020-02-12 RX ORDER — DOCUSATE SODIUM 100 MG/1
100 CAPSULE, LIQUID FILLED ORAL 2 TIMES DAILY
Status: DISCONTINUED | OUTPATIENT
Start: 2020-02-12 | End: 2020-02-13 | Stop reason: HOSPADM

## 2020-02-12 RX ORDER — PROCHLORPERAZINE MALEATE 5 MG/1
5 TABLET ORAL EVERY 6 HOURS PRN
Status: DISCONTINUED | OUTPATIENT
Start: 2020-02-12 | End: 2020-02-13 | Stop reason: HOSPADM

## 2020-02-12 RX ORDER — LIDOCAINE HYDROCHLORIDE 10 MG/ML
INJECTION, SOLUTION EPIDURAL; INFILTRATION; INTRACAUDAL; PERINEURAL AS NEEDED
Status: DISCONTINUED | OUTPATIENT
Start: 2020-02-12 | End: 2020-02-12 | Stop reason: SURG

## 2020-02-12 RX ORDER — FENTANYL CITRATE 50 UG/ML
INJECTION, SOLUTION INTRAMUSCULAR; INTRAVENOUS AS NEEDED
Status: DISCONTINUED | OUTPATIENT
Start: 2020-02-12 | End: 2020-02-12 | Stop reason: SURG

## 2020-02-12 RX ORDER — NALOXONE HCL 0.4 MG/ML
0.1 VIAL (ML) INJECTION
Status: DISCONTINUED | OUTPATIENT
Start: 2020-02-12 | End: 2020-02-13 | Stop reason: HOSPADM

## 2020-02-12 RX ORDER — BUPIVACAINE HYDROCHLORIDE AND EPINEPHRINE 2.5; 5 MG/ML; UG/ML
INJECTION, SOLUTION EPIDURAL; INFILTRATION; INTRACAUDAL; PERINEURAL AS NEEDED
Status: DISCONTINUED | OUTPATIENT
Start: 2020-02-12 | End: 2020-02-12 | Stop reason: HOSPADM

## 2020-02-12 RX ORDER — PROCHLORPERAZINE EDISYLATE 5 MG/ML
5 INJECTION INTRAMUSCULAR; INTRAVENOUS EVERY 6 HOURS PRN
Status: DISCONTINUED | OUTPATIENT
Start: 2020-02-12 | End: 2020-02-13 | Stop reason: HOSPADM

## 2020-02-12 RX ORDER — ONDANSETRON 2 MG/ML
4 INJECTION INTRAMUSCULAR; INTRAVENOUS EVERY 6 HOURS PRN
Status: DISCONTINUED | OUTPATIENT
Start: 2020-02-12 | End: 2020-02-13 | Stop reason: HOSPADM

## 2020-02-12 RX ADMIN — LIDOCAINE HYDROCHLORIDE 50 MG: 10 INJECTION, SOLUTION EPIDURAL; INFILTRATION; INTRACAUDAL; PERINEURAL at 11:10

## 2020-02-12 RX ADMIN — Medication 3 G: at 11:04

## 2020-02-12 RX ADMIN — PROPOFOL 120 MCG/KG/MIN: 10 INJECTION, EMULSION INTRAVENOUS at 11:38

## 2020-02-12 RX ADMIN — HYDROCODONE BITARTRATE AND ACETAMINOPHEN 1 TABLET: 5; 325 TABLET ORAL at 18:20

## 2020-02-12 RX ADMIN — PROPOFOL 50 MG: 10 INJECTION, EMULSION INTRAVENOUS at 11:19

## 2020-02-12 RX ADMIN — INSULIN LISPRO 2 UNITS: 100 INJECTION, SOLUTION INTRAVENOUS; SUBCUTANEOUS at 18:20

## 2020-02-12 RX ADMIN — PROPOFOL 100 MCG/KG/MIN: 10 INJECTION, EMULSION INTRAVENOUS at 11:16

## 2020-02-12 RX ADMIN — ONDANSETRON 4 MG: 2 INJECTION INTRAMUSCULAR; INTRAVENOUS at 13:07

## 2020-02-12 RX ADMIN — CEFAZOLIN 3 G: 10 INJECTION, POWDER, FOR SOLUTION INTRAVENOUS; PARENTERAL at 18:15

## 2020-02-12 RX ADMIN — PROPOFOL 50 MG: 10 INJECTION, EMULSION INTRAVENOUS at 11:10

## 2020-02-12 RX ADMIN — SODIUM CHLORIDE 150 ML/HR: 9 INJECTION, SOLUTION INTRAVENOUS at 16:37

## 2020-02-12 RX ADMIN — SODIUM CHLORIDE, POTASSIUM CHLORIDE, SODIUM LACTATE AND CALCIUM CHLORIDE 9 ML/HR: 600; 310; 30; 20 INJECTION, SOLUTION INTRAVENOUS at 09:49

## 2020-02-12 RX ADMIN — PROPOFOL 80 MCG/KG/MIN: 10 INJECTION, EMULSION INTRAVENOUS at 12:44

## 2020-02-12 RX ADMIN — PREGABALIN 75 MG: 75 CAPSULE ORAL at 09:50

## 2020-02-12 RX ADMIN — FENTANYL CITRATE 100 MCG: 50 INJECTION, SOLUTION INTRAMUSCULAR; INTRAVENOUS at 13:35

## 2020-02-12 RX ADMIN — PROPOFOL 80 MCG/KG/MIN: 10 INJECTION, EMULSION INTRAVENOUS at 12:00

## 2020-02-12 RX ADMIN — TRANEXAMIC ACID 1000 MG: 100 INJECTION, SOLUTION INTRAVENOUS at 11:19

## 2020-02-12 RX ADMIN — TRANEXAMIC ACID 1000 MG: 100 INJECTION, SOLUTION INTRAVENOUS at 13:00

## 2020-02-12 RX ADMIN — VALSARTAN 320 MG: 160 TABLET, FILM COATED ORAL at 18:15

## 2020-02-12 RX ADMIN — LIDOCAINE HYDROCHLORIDE 0.3 ML: 10 INJECTION, SOLUTION EPIDURAL; INFILTRATION; INTRACAUDAL; PERINEURAL at 09:50

## 2020-02-12 RX ADMIN — ONDANSETRON 4 MG: 2 INJECTION INTRAMUSCULAR; INTRAVENOUS at 16:37

## 2020-02-12 RX ADMIN — DOCUSATE SODIUM 100 MG: 100 CAPSULE, LIQUID FILLED ORAL at 21:07

## 2020-02-12 RX ADMIN — LIDOCAINE HYDROCHLORIDE 50 MG: 10 INJECTION, SOLUTION EPIDURAL; INFILTRATION; INTRACAUDAL; PERINEURAL at 11:18

## 2020-02-12 RX ADMIN — HYDROMORPHONE HYDROCHLORIDE 0.5 MG: 1 INJECTION, SOLUTION INTRAMUSCULAR; INTRAVENOUS; SUBCUTANEOUS at 13:42

## 2020-02-12 RX ADMIN — PROCHLORPERAZINE EDISYLATE 5 MG: 5 INJECTION INTRAMUSCULAR; INTRAVENOUS at 18:40

## 2020-02-12 RX ADMIN — HYDROCODONE BITARTRATE AND ACETAMINOPHEN 1 TABLET: 5; 325 TABLET ORAL at 23:02

## 2020-02-12 RX ADMIN — HYDROMORPHONE HYDROCHLORIDE 0.5 MG: 1 INJECTION, SOLUTION INTRAMUSCULAR; INTRAVENOUS; SUBCUTANEOUS at 14:56

## 2020-02-12 RX ADMIN — HYDROMORPHONE HYDROCHLORIDE 0.5 MG: 1 INJECTION, SOLUTION INTRAMUSCULAR; INTRAVENOUS; SUBCUTANEOUS at 13:55

## 2020-02-12 RX ADMIN — MUPIROCIN 1 APPLICATION: 20 OINTMENT TOPICAL at 09:50

## 2020-02-12 RX ADMIN — PROPOFOL 100 MG: 10 INJECTION, EMULSION INTRAVENOUS at 11:29

## 2020-02-12 RX ADMIN — BUPIVACAINE HYDROCHLORIDE 2 ML: 5 INJECTION, SOLUTION PERINEURAL at 11:11

## 2020-02-12 RX ADMIN — HYDROMORPHONE HYDROCHLORIDE 0.5 MG: 1 INJECTION, SOLUTION INTRAMUSCULAR; INTRAVENOUS; SUBCUTANEOUS at 13:49

## 2020-02-12 RX ADMIN — FAMOTIDINE 20 MG: 20 TABLET ORAL at 09:49

## 2020-02-12 NOTE — ANESTHESIA PREPROCEDURE EVALUATION
Anesthesia Evaluation     Patient summary reviewed and Nursing notes reviewed   history of anesthetic complications: PONV  NPO Solid Status: > 8 hours  NPO Liquid Status: > 2 hours           Airway   Mallampati: II  TM distance: >3 FB  Neck ROM: full  No difficulty expected  Dental          Pulmonary - negative pulmonary ROS and normal exam    breath sounds clear to auscultation  (-) COPD, asthma, sleep apnea  Cardiovascular - normal exam  Exercise tolerance: poor (<4 METS)    ECG reviewed  Rhythm: regular  Rate: normal    (+) hypertension, hyperlipidemia,   (-) angina, CHF, AVILEZ, murmur      Neuro/Psych  (-) seizures, CVA  GI/Hepatic/Renal/Endo    (+) morbid obesity,    (-) liver disease, no renal disease    Musculoskeletal     Abdominal   (+) obese,    Substance History - negative use     OB/GYN          Other   arthritis,                    Anesthesia Plan    ASA 3     MAC and spinal     intravenous induction     Anesthetic plan, all risks, benefits, and alternatives have been provided, discussed and informed consent has been obtained with: patient.    Plan discussed with CRNA.

## 2020-02-12 NOTE — PLAN OF CARE
Problem: Patient Care Overview  Goal: Plan of Care Review  Outcome: Ongoing (interventions implemented as appropriate)  Flowsheets (Taken 2/12/2020 1620)  Outcome Summary: Patient ambulated 20 feet with RW and step through gait pattern, limited by anterior hip burning pain and nausea. Plan is d/c home with family and HHPT. Encouraged patient to ambulate with nursing again later tonight. Will continue to progress as able. PADD score of 9. Recommend OT consult for ADL and adaptive equipment training.

## 2020-02-12 NOTE — ANESTHESIA POSTPROCEDURE EVALUATION
Patient: Lorrie Espinosa    Procedure Summary     Date:  02/12/20 Room / Location:   [facility]  /  [facility]    Anesthesia Start:  1102 Anesthesia Stop:      Procedure:  TOTAL HIP ARTHROPLASTY ANTERIOR RIGHT (Right Hip) Diagnosis:      Surgeon:  Gabe Carranza MD Provider:  Booker Amezquita MD    Anesthesia Type:  MAC, spinal ASA Status:  3          Anesthesia Type: MAC, spinal    Vitals  Vitals Value Taken Time   /93 2/12/2020  1:29 PM   Temp 97.4 °F (36.3 °C) 2/12/2020  1:29 PM   Pulse 67 2/12/2020  1:40 PM   Resp 16 2/12/2020  1:29 PM   SpO2 99 % 2/12/2020  1:40 PM   Vitals shown include unvalidated device data.        Post Anesthesia Care and Evaluation    Patient location during evaluation: PACU  Patient participation: complete - patient participated  Level of consciousness: awake and responsive to verbal stimuli  Pain score: 7  Pain management: adequate  Airway patency: patent  Anesthetic complications: No anesthetic complications  PONV Status: none  Cardiovascular status: stable  Respiratory status: acceptable, nasal cannula and spontaneous ventilation  Hydration status: stable    Comments: Pt transferred to PACU with O2. Vital signs stable. Report to PACU RN and care accepted. Pt c/o lower back pain. Fentanyl 100mcg x2 IV given.

## 2020-02-12 NOTE — INTERVAL H&P NOTE
"Pre-Op H&P (See Recent Office Note Attached for Full H&P)    Chief complaint: Right hip pain    HPI:      Patient is a 54 y.o. female who presents with a diagnosis of right hip osteoarthritis. Conservative treatment has failed to provide significant relief. She is here today for a right anterior total hip arthroplasty.    Review of Systems:  General ROS:  no fever, chills, rashes, No change since last office visit  Cardiovascular ROS: no chest pain or dyspnea on exertion  Respiratory ROS: no cough, shortness of breath, or wheezing      Meds:    No current facility-administered medications on file prior to encounter.      Current Outpatient Medications on File Prior to Encounter   Medication Sig Dispense Refill   • amLODIPine (NORVASC) 5 MG tablet Take 1 tablet by mouth Daily. 90 tablet 5   • rosuvastatin (CRESTOR) 20 MG tablet Take 1 tablet by mouth Daily. 90 tablet 3   • valsartan-hydrochlorothiazide (DIOVAN-HCT) 320-12.5 MG per tablet Take 1 tablet by mouth Daily. 90 tablet 3   • vitamin d (CHOLECALIFEROL) 5000 units capsule Take 1 capsule by mouth Daily. 100 capsule 4       Vital Signs:  /91 (BP Location: Right arm, Patient Position: Lying)   Pulse 82   Temp 98.1 °F (36.7 °C) (Temporal)   Resp 18   Ht 170.2 cm (67\")   Wt (!) 147 kg (324 lb 1.2 oz)   SpO2 97%   BMI 50.76 kg/m²     Physical Exam:    CV:  S1S2 regular rate and rhythm, no murmur               Resp:  Clear to auscultation; respirations regular, even and unlabored    Results Review:     Lab Results   Component Value Date    WBC 12.73 (H) 02/04/2020    HGB 12.7 02/04/2020    HCT 39.4 02/04/2020    MCV 97.5 (H) 02/04/2020     02/04/2020    NEUTROABS 9.69 (H) 02/04/2020    GLUCOSE 89 02/04/2020    BUN 20 02/04/2020    CREATININE 0.78 02/04/2020    EGFRIFNONA 77 02/04/2020     02/04/2020    K 3.8 02/04/2020    CL 99 02/04/2020    CO2 27.0 02/04/2020    CALCIUM 9.4 02/04/2020    ALBUMIN 4.50 02/04/2020    AST 19 02/04/2020    ALT " 17 02/04/2020    BILITOT 0.4 02/04/2020        I reviewed the patient's new clinical results.     2/4/20 ECG: reviewed- normal sinus rhythm, left axis deviation, incomplete right bundle branch block  Abnormal ECG- when compared with ECG of 2/4/2014, no significant change was found    2/4/20 CXR: reviewed, no acute cardiopulmonary disease    Cancer Staging (if applicable)  Cancer Patient: __ yes _X_no __unknown; If yes, clinical stage T:__ N:__M:__, stage group or __N/A    Assessment: Primary osteoarthritis of right hip    Plan:   1. Right anterior total hip arthroplasty  2. Anesthesia aware of elevated BP. Will continue to monitor closely.      Pennie Carver, SUMAN  2/12/2020   10:02 AM

## 2020-02-12 NOTE — OP NOTE
TOTAL HIP ARTHROPLASTY ANTERIOR  Progress Note    Lorrie Espinosa  2/12/2020    Pre-op Diagnosis:   Right hip osteoarthritis       Post-Op Diagnosis Codes:  Right hip osteoarthritis    Procedure/CPT® Codes:  19799    Procedure(s):  TOTAL HIP ARTHROPLASTY ANTERIOR RIGHT    Surgeon(s):  Gabe Carranza MD    Anesthesia: Spinal    Staff:   Circulator: Shasta Miller RN; Odalis Bray RN  Radiology Technologist: Mireya Otoole  Scrub Person: Rad Eubanks; Moy Rios  Vendor Representative: Dillon Parra  Nursing Assistant: Ad Acosta; Rosalinda Guerin; Vicki Vizcarra  Assistant: Carmina Rivera PA-C    Estimated Blood Loss: 200 mL    Urine Voided: * No values recorded between 2/12/2020 11:02 AM and 2/12/2020  1:27 PM *    Specimens:                None          Drains: * No LDAs found *    Findings: Delaminating articular cartilage with moderate to severe effusion    Complications: Immediate postop anterior dislocation after resolution of spinal anesthesia requiring propofol sedation for immediate closed reduction      Implants: [employer] R3 52 acetabular cup; screw x1; N/N polyethylene liner             Polarstem press-fit femur size 3 KA with +0/36 neck/head adapter      Indications:  54-year-old woman with end-stage right hip osteoarthritis symptoms. X-rays show minimal narrowing but MRI scan has shown osteo-edema. Pain is limiting routine daily activities. Risks benefits indications and rationale for total hip arthroplasty discussed at length with patient.  Patient is advised on possibility of perioperative medical complications requiring prolonged hospitalization as well as infectious or wound complications requiring further surgery.  Patient signs own consent after all questions are answered.      Procedure:  While in the OR spinal anesthetic achieved with satisfactory level. Turned to the supine position and prepped and draped in standard fashion for anterior approach to the left  hip on the [place] table. Fluoroscopy used to assess limb lengths. These were restored with final component selection and position. Incision made over the tensor fascia gaston and routine dissection carried down to raise the fascia over the medial margin of the tensor muscle belly. Circumflex vessels were identified and cauterized. Bleeding reasonably controlled with estimated total blood loss 200 mL. Femoral neck isolated. T-shaped capsulotomy created.  Moderate to severe effusion relieved. Standard neck cut made and head removed without difficulty noting delaminating articular cartilage. Acetabulum was exposed circumferentially. Acetabulum reamed from size 45 to size 52 noting satisfactory exposure of the subchondral bone with the size 52. Size 52 final component was seated with excellent press-fit characteristics.  One additional screw was placed due to soft bone characteristics.  Polyethylene insert placed without difficulty. Horizontal tilt was thought to be approximately 40° from Hilgenreiner's line. Anteversion approximately 25°. Component was under the anterior inferior acetabular margin. Acetabulum was thoroughly irrigated before during and after component placement.      Attention turned to the proximal femur. Trochanteric hook placed and hip externally rotated eventually to 160° after complete posterior lateral releases. Standard piriformis landmarks were used. Broached from size 0/1 to size 3 with good position relative to standard landmarks. Calcar reamer was passed after trials showed satisfactory recovery of preoperative limb lengths. Final component seated completely relative to the reaming with excellent rotational stability. Limb lengths appeared to be restored best with +0x36 mm head and neck adapter. Final components assembled and reduced, requiring manual assistance. Wound thoroughly irrigated and closed in layers without a drain. Bleeding appeared to be well controlled at closure. Joint space  injected with ropivacaine through a spinal needle placed percutaneously during closure. Standard Roxanna dressing applied. Final counts were correct. Patient stable to recovery having tolerated procedure well throughout.  Immediate postop x-rays identified anterior dislocation and limb was found to be in external rotation.  Spinal anesthesia had resolved and anesthesia was available for propofol sedation with immediate easy reduction.  Reduction appeared secured with slight abduction and hip flexion with 1-2 pillows under the knee and leg.          Gabe Carranza MD    Date: 2/12/2020  Time: 4:22 PM

## 2020-02-12 NOTE — ANESTHESIA PROCEDURE NOTES
Spinal Block      Patient reassessed immediately prior to procedure    Patient location during procedure: OR  Indication:at surgeon's request  Performed By  CRNA: Abimbola Thrasher CRNA  Preanesthetic Checklist  Completed: patient identified, site marked, surgical consent, pre-op evaluation, timeout performed, IV checked, risks and benefits discussed and monitors and equipment checked  Spinal Block Prep:  Patient Position:sitting  Sterile Tech:cap, gloves, sterile barriers and mask  Prep:Chloraprep  Patient Monitoring:continuous pulse oximetry  Spinal Block Procedure  Approach:midline  Guidance:landmark technique and palpation technique  Location:L4-L5  Needle Type:Quincke  Needle Gauge:22 G  Placement of Spinal needle event:cerebrospinal fluid aspirated  Paresthesia: no  Fluid Appearance:clear  Medications: bupivacaine (MARCAINE) 0.5 % injection, 2 mL  Med Administered at 2/12/2020 11:11 AM   Post Assessment  Patient Tolerance:patient tolerated the procedure well with no apparent complications  Complications no  Additional Notes  Procedure:  Pt assisted to sitting position, with legs in position of comfort over side of bed.  Pt. instructed in optimal spine presentation, the spine was prepped/ Draped and the skin at insertion site was anesthetized with 1% Lidocaine 2 ml.  The spinal needle was then advanced until CSF flow was obtained and LA was injected:

## 2020-02-12 NOTE — PLAN OF CARE
Pt vss; dressing cdi; worked with therapy and ambulated; nausea and vomiting treated with zofran; due to void; right leg elevated on pillow. No numbness or tingling.

## 2020-02-12 NOTE — THERAPY EVALUATION
Patient Name: Lorrie Espinosa  : 1965    MRN: [id number]                              Today's Date: 2020       Admit Date: 2020    Visit Dx: No diagnosis found.  Patient Active Problem List   Diagnosis   • Arthritis of right hip   • Hypertension   • Hyperlipidemia   • Obesity   • Prediabetes     Past Medical History:   Diagnosis Date   • Elevated cholesterol    • Hypertension    • Tattoo    • Wears glasses      Past Surgical History:   Procedure Laterality Date   • ANKLE SURGERY     • COLONOSCOPY     • TOTAL HIP ARTHROPLASTY Left      General Information     Row Name 20 1620          PT Evaluation Time/Intention    Document Type  evaluation  -LR     Mode of Treatment  physical therapy;individual therapy  -LR     Row Name 20 1620          General Information    Patient Profile Reviewed?  yes  -LR     Prior Level of Function  min assist:;all household mobility;community mobility;gait;transfer;bed mobility;bathing;home management;cooking;cleaning;using stairs;shopping;independent:;driving all mobility limited by pain  -LR     Existing Precautions/Restrictions  fall;right;hip, anterior  -LR     Barriers to Rehab  previous functional deficit  -LR     Row Name 20 1620          Relationship/Environment    Lives With  spouse available to assist at all times upon d/c home  -LR     Row Name 20 1620          Resource/Environmental Concerns    Current Living Arrangements  home/apartment/condo  -LR     Row Name 20 1620          Home Main Entrance    Number of Stairs, Main Entrance  three deep enough to place RW on   -LR     Stair Railings, Main Entrance  none  -LR     Row Name 20 1620          Stairs Within Home, Primary    Number of Stairs, Within Home, Primary  none  -LR     Row Name 20 1620          Cognitive Assessment/Intervention- PT/OT    Orientation Status (Cognition)  oriented x 4  -LR     Row Name 20 1620          Safety Issues, Functional  Mobility    Safety Issues Affecting Function (Mobility)  positioning of assistive device;sequencing abilities;safety precautions follow-through/compliance;judgment;insight into deficits/self awareness;awareness of need for assistance;safety precaution awareness  -LR     Impairments Affecting Function (Mobility)  pain;strength;range of motion (ROM)  -LR       User Key  (r) = Recorded By, (t) = Taken By, (c) = Cosigned By    Initials Name Provider Type    LR Mandie Gutierrez, PT Physical Therapist        Mobility     Row Name 02/12/20 1620          Bed Mobility Assessment/Treatment    Bed Mobility Assessment/Treatment  supine-sit  -LR     Supine-Sit [place] (Bed Mobility)  verbal cues;minimum assist (75% patient effort)  -LR     Assistive Device (Bed Mobility)  head of bed elevated;bed rails  -LR     Comment (Bed Mobility)  Verbal cues to move LEs towards EOB and to push up from bed to raise trunk into sitting and to scoot hips out to get feet on floor. Cues to not rotate at R hip while bringing R LE across bed. Min assist to move R LE. Denied dizziness upon sitting up. C/o nausea.   -LR     Row Name 02/12/20 1620          Transfer Assessment/Treatment    Comment (Transfers)  Verbal cues to push up from bed to stand and to reach back for chair to lower into sitting. Verbal cues to step R LE out before t/f for comfort.   -LR     Row Name 02/12/20 1620          Sit-Stand Transfer    Sit-Stand [place] (Transfers)  verbal cues;minimum assist (75% patient effort);2 person assist  -LR     Assistive Device (Sit-Stand Transfers)  walker, front-wheeled  -LR     Row Name 02/12/20 1620          Gait/Stairs Assessment/Training    94441 - Gait Training Minutes   6  -LR     [place] Level (Gait)  verbal cues;contact guard;2 person assist  -LR     Assistive Device (Gait)  walker, front-wheeled  -LR     Distance in Feet (Gait)  20  -LR     Pattern (Gait)  step-through;step-to  -LR     Deviations/Abnormal Patterns  (Gait)  right sided deviations;antalgic;bilateral deviations;nery decreased;gait speed decreased;stride length decreased  -LR     Bilateral Gait Deviations  forward flexed posture;heel strike decreased  -LR     Right Sided Gait Deviations  weight shift ability decreased  -LR     Comment (Gait/Stairs)  Patient ambulated with step to gait pattern initially. Verbal cues for correct sequencing of steps, increased R LE weight bearing/stance phase, and decreased UE weight bearing. Improved with cues and able to progress briefly to step through gait pattern. Gait limited by pain and nausea, followed with chair.   -LR     Row Name 02/12/20 1620          Mobility Assessment/Intervention    Extremity Weight-bearing Status  right lower extremity  -LR     Right Lower Extremity (Weight-bearing Status)  weight-bearing as tolerated (WBAT)  -LR       User Key  (r) = Recorded By, (t) = Taken By, (c) = Cosigned By    Initials Name Provider Type    Mandie Matias, PT Physical Therapist        Obj/Interventions     Row Name 02/12/20 1620          General ROM    GENERAL ROM COMMENTS  L LE AROM WFL; R hip AROM impaired 50% d/t weakness and pain  -LR     Row Name 02/12/20 1620          MMT (Manual Muscle Testing)    General MMT Comments  L LE WFL; R hip functionally 2+/5, unable to perform SLR independently, no knee buckling with gait  -LR     Row Name 02/12/20 1620          Therapeutic Exercise    Lower Extremity (Therapeutic Exercise)  gluteal sets;quad sets, right  -LR     Lower Extremity Range of Motion (Therapeutic Exercise)  ankle dorsiflexion/plantar flexion, right  -LR     Exercise Type (Therapeutic Exercise)  AROM (active range of motion);isotonic contraction, concentric;isometric contraction, static  -LR     Position (Therapeutic Exercise)  supine  -LR     Sets/Reps (Therapeutic Exercise)  x10 reps each  -LR     Comment (Therapeutic Exercise)  cues for technique; able to actively DF bilaterally  -LR     Row Name  02/12/20 1620          Static Sitting Balance    Level of [place] (Unsupported Sitting, Static Balance)  contact guard assist  -LR     Sitting Position (Unsupported Sitting, Static Balance)  sitting on edge of bed  -LR     Time Able to Maintain Position (Unsupported Sitting, Static Balance)  more than 5 minutes  -LR     Comment (Unsupported Sitting, Static Balance)  spent significant time sitting EOB, attempting to improve nausea  -LR     Row Name 02/12/20 1620          Sensory Assessment/Intervention    Sensory General Assessment  no sensation deficits identified denies numbness/tingling;light touch equal and intact  -LR       User Key  (r) = Recorded By, (t) = Taken By, (c) = Cosigned By    Initials Name Provider Type    LR Mandie Gutierrez, PT Physical Therapist        Goals/Plan     Row Name 02/12/20 1620          Bed Mobility Goal 1 (PT)    Activity/Assistive Device (Bed Mobility Goal 1, PT)  sit to supine/supine to sit  -LR     [place] Level/Cues Needed (Bed Mobility Goal 1, PT)  conditional independence  -LR     Time Frame (Bed Mobility Goal 1, PT)  long term goal (LTG);3 days  -LR     Progress/Outcomes (Bed Mobility Goal 1, PT)  goal ongoing  -LR     Row Name 02/12/20 1620          Transfer Goal 1 (PT)    Activity/Assistive Device (Transfer Goal 1, PT)  sit-to-stand/stand-to-sit;walker, rolling  -LR     [place] Level/Cues Needed (Transfer Goal 1, PT)  conditional independence  -LR     Time Frame (Transfer Goal 1, PT)  long term goal (LTG);3 days  -LR     Progress/Outcome (Transfer Goal 1, PT)  goal ongoing  -LR     Row Name 02/12/20 1620          Gait Training Goal 1 (PT)    Activity/Assistive Device (Gait Training Goal 1, PT)  gait (walking locomotion);walker, rolling  -LR     [place] Level (Gait Training Goal 1, PT)  conditional independence  -LR     Distance (Gait Goal 1, PT)  500 feet  -LR     Time Frame (Gait Training Goal 1, PT)  long term goal (LTG);3 days  -LR      Progress/Outcome (Gait Training Goal 1, PT)  goal ongoing  -LR     Row Name 02/12/20 1620          Stairs Goal 1 (PT)    Activity/Assistive Device (Stairs Goal 1, PT)  ascending stairs;descending stairs;step-to-step;walker, rolling  -LR     [place] Level/Cues Needed (Stairs Goal 1, PT)  contact guard assist  -LR     Number of Stairs (Stairs Goal 1, PT)  1  -LR     Time Frame (Stairs Goal 1, PT)  long term goal (LTG);3 days  -LR     Progress/Outcome (Stairs Goal 1, PT)  goal ongoing  -LR       User Key  (r) = Recorded By, (t) = Taken By, (c) = Cosigned By    Initials Name Provider Type    LR Mandie Gutierrez, PT Physical Therapist        Clinical Impression     Row Name 02/12/20 1620          Pain Assessment    Additional Documentation  Pain Scale: Numbers Pre/Post-Treatment (Group)  -LR     Row Name 02/12/20 1620          Pain Scale: Numbers Pre/Post-Treatment    Pain Scale: Numbers, Pretreatment  7/10  -LR     Pain Scale: Numbers, Post-Treatment  8/10  -LR     Pain Location - Side  Right  -LR     Pain Location - Orientation  anterior  -LR     Pain Location  hip  -LR     Pre/Post Treatment Pain Comment  burning  -LR     Pain Intervention(s)  Ambulation/increased activity;Repositioned;Cold applied  -LR     Row Name 02/12/20 1620          Plan of Care Review    Plan of Care Reviewed With  patient;spouse  -LR     Progress  improving  -LR     Row Name 02/12/20 1620          Physical Therapy Clinical Impression    Patient/Family Goals Statement (PT Clinical Impression)  go home, decrease pain  -LR     Criteria for Skilled Interventions Met (PT Clinical Impression)  yes;treatment indicated  -LR     Rehab Potential (PT Clinical Summary)  good, to achieve stated therapy goals  -LR     Row Name 02/12/20 1620          Positioning and Restraints    Pre-Treatment Position  in bed  -LR     Post Treatment Position  chair  -LR     In Chair  notified nsg;reclined;call light within reach;encouraged to call for assist;exit  alarm on;with family/caregiver;legs elevated;compression device  -LR       User Key  (r) = Recorded By, (t) = Taken By, (c) = Cosigned By    Initials Name Provider Type    Mandie Matias, PT Physical Therapist        Outcome Measures     Row Name 02/12/20 1620          How much help from another person do you currently need...    Turning from your back to your side while in flat bed without using bedrails?  3  -LR     Moving from lying on back to sitting on the side of a flat bed without bedrails?  3  -LR     Moving to and from a bed to a chair (including a wheelchair)?  3  -LR     Standing up from a chair using your arms (e.g., wheelchair, bedside chair)?  2  -LR     Climbing 3-5 steps with a railing?  1  -LR     To walk in hospital room?  3  -LR     AM-PAC 6 Clicks Score (PT)  15  -LR     Row Name 02/12/20 1620          Functional Assessment    Outcome Measure Options  AM-PAC 6 Clicks Basic Mobility (PT)  -LR       User Key  (r) = Recorded By, (t) = Taken By, (c) = Cosigned By    Initials Name Provider Type    Mandie Matias, PT Physical Therapist          PT Recommendation and Plan  Planned Therapy Interventions (PT Eval): balance training, bed mobility training, gait training, home exercise program, stair training, ROM (range of motion), strengthening, patient/family education, transfer training  Outcome Summary/Treatment Plan (PT)  Anticipated Equipment Needs at Discharge (PT): other (see comments)(none)  Anticipated Discharge Disposition (PT): home with assist, home with home health  Plan of Care Reviewed With: patient, spouse  Progress: improving  Outcome Summary: Patient ambulated 20 feet with RW and step through gait pattern, limited by anterior hip burning pain and nausea. Plan is d/c home with family and HHPT. Encouraged patient to ambulate with nursing again later tonight. Will continue to progress as able. PADD score of 9.     Time Calculation:   PT Charges     Row Name 02/12/20  1620             Time Calculation    Start Time  1620  -LR      PT Received On  02/12/20  -LR      PT Goal Re-Cert Due Date  02/22/20  -LR         Time Calculation- PT    Total Timed Code Minutes- PT  8 minute(s)  -LR         Timed Charges    11956 - PT Therapeutic Exercise Minutes  2  -LR      99488 - Gait Training Minutes   6  -LR        User Key  (r) = Recorded By, (t) = Taken By, (c) = Cosigned By    Initials Name Provider Type    LR Mandie Gutierrez, PT Physical Therapist        Therapy Charges for Today     Code Description Service Date Service Provider Modifiers Qty    87600844788 HC GAIT TRAINING EA 15 MIN 2/12/2020 Mandie Gutierrez, PT GP 1    01040330154 HC PT THER SUPP EA 15 MIN 2/12/2020 Mandie Gutierrez, PT GP 3    08865981513 HC PT EVAL MOD COMPLEXITY 3 2/12/2020 Mandie Gutierrez, PT GP 1          PT G-Codes  Outcome Measure Options: AM-PAC 6 Clicks Basic Mobility (PT)  AM-PAC 6 Clicks Score (PT): 15    Mandie Gutierrez, PT  2/12/2020

## 2020-02-12 NOTE — H&P
Patient Name: Lorrie Espinosa  MRN: [id number]  : 1965  DOS: 2020    Attending: Gabe Carranza MD    Primary Care Provider: Bam Diallo MD      Chief complaint:  Right hip pain    Subjective   Patient is a 54 y.o. female presented for scheduled surgery by Dr. Carranza.  She anticipates a right total hip replacement today.  Her hip has been severely painful for the last several months.  She denies use of assistive device for ambulation or recent falls.    When seen preop she is doing well.  She denies pain or other complaints.  She denies nausea, shortness of breath or chest pain.  No history of DVT or PE.     Allergies:  No Known Allergies    Meds:  Medications Prior to Admission   Medication Sig Dispense Refill Last Dose   • acetaminophen (TYLENOL) 500 MG tablet Take 500 mg by mouth Every 6 (Six) Hours As Needed for Mild Pain  (SOMETIMES INCREASES DOSE TO 1000 MG).   2020 at 2100   • amLODIPine (NORVASC) 5 MG tablet Take 1 tablet by mouth Daily. 90 tablet 5 2020 at 0800   • Cyanocobalamin (B-12 PO) Take 500 mcg by mouth Daily.   2020 at Unknown time   • gabapentin (NEURONTIN) 400 MG capsule Take 400 mg by mouth As Needed (THE PRESCRIPTION IS FOR PATIENTS  BUT WAS TOLD BY HER MD SHE COULD TAKE).   Past Week at Unknown time   • LORATADINE PO Take 10 mg by mouth Daily.   2020 at Unknown time   • rosuvastatin (CRESTOR) 20 MG tablet Take 1 tablet by mouth Daily. 90 tablet 3 2020 at 2100   • valsartan-hydrochlorothiazide (DIOVAN-HCT) 320-12.5 MG per tablet Take 1 tablet by mouth Daily. 90 tablet 3 2020 at 0800   • vitamin d (CHOLECALIFEROL) 5000 units capsule Take 1 capsule by mouth Daily. 100 capsule 4 2020 at 0800   • aspirin 81 MG EC tablet Take 81 mg by mouth Daily.   2020   • DICLOFENAC PO Take 1 tablet by mouth 2 (Two) Times a Day.      • HYDROcodone-acetaminophen (NORCO) 7.5-325 MG per tablet Take 1 tablet by mouth Every 6 (Six) Hours As Needed for  "Moderate Pain .   2/10/2020 at 2100       History:   Past Medical History:   Diagnosis Date   • Elevated cholesterol    • Hypertension    • Tattoo    • Wears glasses      Past Surgical History:   Procedure Laterality Date   • ANKLE SURGERY     • COLONOSCOPY  2015   • TOTAL HIP ARTHROPLASTY Left 2014     Family History   Problem Relation Age of Onset   • Breast cancer Neg Hx      Social History     Tobacco Use   • Smoking status: Never Smoker   • Smokeless tobacco: Never Used   Substance Use Topics   • Alcohol use: Never     Frequency: Never   • Drug use: Never   She is  with 1 child.  She is a  at [employer].    Review of Systems  Pertinent items are noted in HPI, all other systems reviewed and negative    Vital Signs  Visit Vitals  /91 (BP Location: Right arm, Patient Position: Lying)   Pulse 82   Temp 98.1 °F (36.7 °C) (Temporal)   Resp 18   Ht 170.2 cm (67\")   Wt (!) 147 kg (324 lb 1.2 oz)   SpO2 97%   BMI 50.76 kg/m²       Physical Exam:    General Appearance:    Alert, cooperative, in no acute distress   Head:    Normocephalic, without obvious abnormality, atraumatic   Eyes:            Lids and lashes normal, conjunctivae and sclerae normal, no   icterus, no pallor, corneas clear   Ears:    Ears appear intact with no abnormalities noted   Neck:   No adenopathy, supple, trachea midline, no thyromegaly   Lungs:     Clear to auscultation, respirations regular, even and                   unlabored    Heart:    Regular rhythm and normal rate, normal S1 and S2, no            murmur, no gallop   Abdomen:     Normal bowel sounds, no masses, no organomegaly, soft        non-tender, non-distended, no guarding, no rebound                 tenderness.  Obese   Genitalia:    Deferred   Extremities:   no edema, no cyanosis, no              redness   Pulses:   Pulses palpable and equal bilaterally   Skin:   No bleeding, bruising or rash   Neurologic:   Cranial nerves 2 - 12 grossly intact, sensation " intact. Flexion and dorsiflexion intact bilateral feet.        I reviewed the patient's new clinical results.       Results from last 7 days   Lab Units 02/12/20  0945   POTASSIUM mmol/L 3.6     Results for HANS TRIVEDI (MRN [id number]) as of 2/12/2020 12:21   Ref. Range 2/4/2020 15:46   Glucose Latest Ref Range: 65 - 99 mg/dL 89   Sodium Latest Ref Range: 136 - 145 mmol/L 140   Potassium Latest Ref Range: 3.5 - 5.2 mmol/L 3.8   CO2 Latest Ref Range: 22.0 - 29.0 mmol/L 27.0   Chloride Latest Ref Range: 98 - 107 mmol/L 99   Anion Gap Latest Ref Range: 5.0 - 15.0 mmol/L 14.0   Creatinine Latest Ref Range: 0.57 - 1.00 mg/dL 0.78   BUN Latest Ref Range: 6 - 20 mg/dL 20   BUN/Creatinine Ratio Latest Ref Range: 7.0 - 25.0  25.6 (H)   Calcium Latest Ref Range: 8.6 - 10.5 mg/dL 9.4   eGFR Non  Am Latest Ref Range: >60 mL/min/1.73 77   Alkaline Phosphatase Latest Ref Range: 39 - 117 U/L 98   Total Protein Latest Ref Range: 6.0 - 8.5 g/dL 8.1   ALT (SGPT) Latest Ref Range: 1 - 33 U/L 17   AST (SGOT) Latest Ref Range: 1 - 32 U/L 19   Total Bilirubin Latest Ref Range: 0.2 - 1.2 mg/dL 0.4   Albumin Latest Ref Range: 3.50 - 5.20 g/dL 4.50   Globulin Latest Units: gm/dL 3.6   A/G Ratio Latest Units: g/dL 1.3   Hemoglobin A1C Latest Ref Range: 4.80 - 5.60 % 5.70 (H)   C-Reactive Protein Latest Ref Range: 0.00 - 0.50 mg/dL 0.70 (H)   WBC Latest Ref Range: 3.40 - 10.80 10*3/mm3 12.73 (H)   RBC Latest Ref Range: 3.77 - 5.28 10*6/mm3 4.04   Hemoglobin Latest Ref Range: 12.0 - 15.9 g/dL 12.7   Hematocrit Latest Ref Range: 34.0 - 46.6 % 39.4   RDW Latest Ref Range: 12.3 - 15.4 % 13.4   MCV Latest Ref Range: 79.0 - 97.0 fL 97.5 (H)   MCH Latest Ref Range: 26.6 - 33.0 pg 31.4   MCHC Latest Ref Range: 31.5 - 35.7 g/dL 32.2   MPV Latest Ref Range: 6.0 - 12.0 fL 9.8   Platelets Latest Ref Range: 140 - 450 10*3/mm3 357     Assessment and Plan:     Arthritis of right hip    Hypertension    Hyperlipidemia    Obesity     Prediabetes      Plan  1. PT/OT- early ambulation postop  2. Pain control-prns   3. IS-encourage  4. DVT proph- mechs/ASA  5. Bowel regimen  6. Resume home medications as appropriate  7. Monitor post-op labs  8. Discharge planning     HTN, Hyperlipidemia  - Continue home Norvasc, Diovan and statin  - Hold HCTZ  - Monitor BP   - Holding parameters for BP meds  - Labetalol PRN for SBP>170    PreDM  - hgb A1c on 2/4/2020 5.7  - Accu-Chek AC and HS with low dose SSI      Aundrea Barth, APRN  02/12/20  12:21 PM     Above is noted, agree.  Ms. Weeks underwent right total hip arthroplasty, anterior, by Dr. Carranza under spinal anesthesia.  Surgery was tolerated well, she was admitted for further management.  Postoperatively she had some complaints of nausea and vomiting, no shortness of breath, no chest pain.  She has already ambulated with physical therapy 20 feet with a rolling walker and step through gait pattern.  Limited by anterior hip burning pain and nausea.  Vital signs temperature 97.9 heart rate 79 respirate 18 blood pressure 137/73  Lungs: Clear to auscultation, no wheezes no rales  Heart: Regular, S1-S2, no gallops  Abdomen: Soft, obese, nontender nondistended.  Extremities: Right hip with clean dry and intact delores dressing anteriorly.  Distal pulses and cap refill intact.  Flexion dorsiflexion bilateral feet is intact.    Assessment:   Status post right total hip arthroplasty, anterior.  Postop nausea and vomiting.    Postoperative management and was discussed with the patient.  She expressed understanding and agreement..wy

## 2020-02-13 VITALS
BODY MASS INDEX: 45.99 KG/M2 | WEIGHT: 293 LBS | RESPIRATION RATE: 18 BRPM | DIASTOLIC BLOOD PRESSURE: 62 MMHG | OXYGEN SATURATION: 96 % | HEART RATE: 86 BPM | TEMPERATURE: 98.2 F | HEIGHT: 67 IN | SYSTOLIC BLOOD PRESSURE: 124 MMHG

## 2020-02-13 PROBLEM — Z96.641 STATUS POST TOTAL REPLACEMENT OF RIGHT HIP: Status: ACTIVE | Noted: 2020-02-13

## 2020-02-13 PROBLEM — D62 ACUTE BLOOD LOSS ANEMIA: Status: ACTIVE | Noted: 2020-02-13

## 2020-02-13 PROBLEM — D72.829 LEUKOCYTOSIS: Status: ACTIVE | Noted: 2020-02-13

## 2020-02-13 PROBLEM — G89.18 ACUTE POSTOPERATIVE PAIN: Status: ACTIVE | Noted: 2020-02-13

## 2020-02-13 LAB
ANION GAP SERPL CALCULATED.3IONS-SCNC: 10 MMOL/L (ref 5–15)
BASOPHILS # BLD AUTO: 0.01 10*3/MM3 (ref 0–0.2)
BASOPHILS NFR BLD AUTO: 0.1 % (ref 0–1.5)
BUN BLD-MCNC: 16 MG/DL (ref 6–20)
BUN/CREAT SERPL: 25 (ref 7–25)
CALCIUM SPEC-SCNC: 8.5 MG/DL (ref 8.6–10.5)
CHLORIDE SERPL-SCNC: 99 MMOL/L (ref 98–107)
CO2 SERPL-SCNC: 27 MMOL/L (ref 22–29)
CREAT BLD-MCNC: 0.64 MG/DL (ref 0.57–1)
DEPRECATED RDW RBC AUTO: 47.9 FL (ref 37–54)
EOSINOPHIL # BLD AUTO: 0.23 10*3/MM3 (ref 0–0.4)
EOSINOPHIL NFR BLD AUTO: 1.8 % (ref 0.3–6.2)
ERYTHROCYTE [DISTWIDTH] IN BLOOD BY AUTOMATED COUNT: 13.8 % (ref 12.3–15.4)
GFR SERPL CREATININE-BSD FRML MDRD: 96 ML/MIN/1.73
GLUCOSE BLD-MCNC: 138 MG/DL (ref 65–99)
GLUCOSE BLDC GLUCOMTR-MCNC: 117 MG/DL (ref 70–130)
GLUCOSE BLDC GLUCOMTR-MCNC: 124 MG/DL (ref 70–130)
HCT VFR BLD AUTO: 33 % (ref 34–46.6)
HGB BLD-MCNC: 10.5 G/DL (ref 12–15.9)
IMM GRANULOCYTES # BLD AUTO: 0.05 10*3/MM3 (ref 0–0.05)
IMM GRANULOCYTES NFR BLD AUTO: 0.4 % (ref 0–0.5)
LYMPHOCYTES # BLD AUTO: 0.65 10*3/MM3 (ref 0.7–3.1)
LYMPHOCYTES NFR BLD AUTO: 5 % (ref 19.6–45.3)
MCH RBC QN AUTO: 30.4 PG (ref 26.6–33)
MCHC RBC AUTO-ENTMCNC: 31.8 G/DL (ref 31.5–35.7)
MCV RBC AUTO: 95.7 FL (ref 79–97)
MONOCYTES # BLD AUTO: 0.81 10*3/MM3 (ref 0.1–0.9)
MONOCYTES NFR BLD AUTO: 6.3 % (ref 5–12)
NEUTROPHILS # BLD AUTO: 11.15 10*3/MM3 (ref 1.7–7)
NEUTROPHILS NFR BLD AUTO: 86.4 % (ref 42.7–76)
NRBC BLD AUTO-RTO: 0 /100 WBC (ref 0–0.2)
PLATELET # BLD AUTO: 289 10*3/MM3 (ref 140–450)
PMV BLD AUTO: 10 FL (ref 6–12)
POTASSIUM BLD-SCNC: 3.8 MMOL/L (ref 3.5–5.2)
RBC # BLD AUTO: 3.45 10*6/MM3 (ref 3.77–5.28)
SODIUM BLD-SCNC: 136 MMOL/L (ref 136–145)
WBC NRBC COR # BLD: 12.9 10*3/MM3 (ref 3.4–10.8)

## 2020-02-13 PROCEDURE — 25010000002 KETOROLAC TROMETHAMINE PER 15 MG: Performed by: ORTHOPAEDIC SURGERY

## 2020-02-13 PROCEDURE — 82962 GLUCOSE BLOOD TEST: CPT

## 2020-02-13 PROCEDURE — 97535 SELF CARE MNGMENT TRAINING: CPT

## 2020-02-13 PROCEDURE — 85025 COMPLETE CBC W/AUTO DIFF WBC: CPT | Performed by: ORTHOPAEDIC SURGERY

## 2020-02-13 PROCEDURE — 25010000002 ONDANSETRON PER 1 MG: Performed by: NURSE PRACTITIONER

## 2020-02-13 PROCEDURE — 97110 THERAPEUTIC EXERCISES: CPT

## 2020-02-13 PROCEDURE — 25010000002 CEFAZOLIN PER 500 MG: Performed by: ORTHOPAEDIC SURGERY

## 2020-02-13 PROCEDURE — 97530 THERAPEUTIC ACTIVITIES: CPT

## 2020-02-13 PROCEDURE — 80048 BASIC METABOLIC PNL TOTAL CA: CPT | Performed by: NURSE PRACTITIONER

## 2020-02-13 PROCEDURE — 97116 GAIT TRAINING THERAPY: CPT

## 2020-02-13 PROCEDURE — 97165 OT EVAL LOW COMPLEX 30 MIN: CPT

## 2020-02-13 RX ORDER — HYDROCODONE BITARTRATE AND ACETAMINOPHEN 5; 325 MG/1; MG/1
1 TABLET ORAL EVERY 4 HOURS PRN
Qty: 40 TABLET | Refills: 0 | Status: SHIPPED | OUTPATIENT
Start: 2020-02-13 | End: 2020-02-22

## 2020-02-13 RX ORDER — DOCUSATE SODIUM 100 MG/1
100 CAPSULE, LIQUID FILLED ORAL 2 TIMES DAILY
Qty: 60 CAPSULE | Refills: 0 | Status: SHIPPED | OUTPATIENT
Start: 2020-02-13

## 2020-02-13 RX ORDER — ASPIRIN 325 MG
325 TABLET ORAL DAILY
Qty: 30 TABLET | Refills: 0 | Status: SHIPPED | OUTPATIENT
Start: 2020-02-14

## 2020-02-13 RX ORDER — ASPIRIN 81 MG/1
81 TABLET ORAL DAILY
Start: 2020-02-13

## 2020-02-13 RX ADMIN — ASPIRIN 325 MG ORAL TABLET 325 MG: 325 PILL ORAL at 09:15

## 2020-02-13 RX ADMIN — HYDROCODONE BITARTRATE AND ACETAMINOPHEN 1 TABLET: 5; 325 TABLET ORAL at 10:36

## 2020-02-13 RX ADMIN — ONDANSETRON 4 MG: 2 INJECTION INTRAMUSCULAR; INTRAVENOUS at 02:17

## 2020-02-13 RX ADMIN — HYDROCODONE BITARTRATE AND ACETAMINOPHEN 1 TABLET: 5; 325 TABLET ORAL at 14:48

## 2020-02-13 RX ADMIN — HYDROCODONE BITARTRATE AND ACETAMINOPHEN 1 TABLET: 5; 325 TABLET ORAL at 05:48

## 2020-02-13 RX ADMIN — AMLODIPINE BESYLATE 5 MG: 5 TABLET ORAL at 09:15

## 2020-02-13 RX ADMIN — ROSUVASTATIN CALCIUM 20 MG: 20 TABLET, COATED ORAL at 09:15

## 2020-02-13 RX ADMIN — VALSARTAN 320 MG: 160 TABLET, FILM COATED ORAL at 09:15

## 2020-02-13 RX ADMIN — DOCUSATE SODIUM 100 MG: 100 CAPSULE, LIQUID FILLED ORAL at 09:15

## 2020-02-13 RX ADMIN — CEFAZOLIN 3 G: 10 INJECTION, POWDER, FOR SOLUTION INTRAVENOUS; PARENTERAL at 02:37

## 2020-02-13 RX ADMIN — KETOROLAC TROMETHAMINE 15 MG: 15 INJECTION, SOLUTION INTRAMUSCULAR; INTRAVENOUS at 02:36

## 2020-02-13 NOTE — THERAPY DISCHARGE NOTE
Patient Name: Lorrie Espinosa  : 1965    MRN: [id number]                              Today's Date: 2020       Admit Date: 2020    Visit Dx:     ICD-10-CM ICD-9-CM   1. Impaired mobility and ADLs Z74.09 799.89   2. Status post total replacement of right hip Z96.641 V43.64   3. Arthritis of right hip M16.11 716.95     Patient Active Problem List   Diagnosis   • Arthritis of right hip   • Hypertension   • Hyperlipidemia   • Obesity   • Prediabetes   • Status post total replacement of right hip     Past Medical History:   Diagnosis Date   • Elevated cholesterol    • Hypertension    • Tattoo    • Wears glasses      Past Surgical History:   Procedure Laterality Date   • ANKLE SURGERY     • COLONOSCOPY     • TOTAL HIP ARTHROPLASTY Left    • TOTAL HIP ARTHROPLASTY Right 2020    Procedure: TOTAL HIP ARTHROPLASTY ANTERIOR RIGHT;  Surgeon: Gabe Carranza MD;  Location: [facility];  Service: Orthopedics;  Laterality: Right;     General Information     Row Name 20 1028          PT Evaluation Time/Intention    Document Type  therapy note (daily note);discharge treatment  -LR     Mode of Treatment  physical therapy;individual therapy  -LR     Row Name 20 1028          General Information    Patient Profile Reviewed?  yes  -LR     Existing Precautions/Restrictions  fall;right;hip, anterior  -LR     Row Name 20 1028          Cognitive Assessment/Intervention- PT/OT    Orientation Status (Cognition)  oriented x 4  -LR     Row Name 20 1028          Safety Issues, Functional Mobility    Safety Issues Affecting Function (Mobility)  positioning of assistive device;sequencing abilities;safety precautions follow-through/compliance;safety precaution awareness  -LR     Impairments Affecting Function (Mobility)  pain;strength;range of motion (ROM)  -LR       User Key  (r) = Recorded By, (t) = Taken By, (c) = Cosigned By    Initials Name Provider Type    LR Mandie Gutierrez, PT  Physical Therapist        Mobility     Row Name 02/13/20 1028          Bed Mobility Assessment/Treatment    Supine-Sit [place] (Bed Mobility)  not tested  -LR     Comment (Bed Mobility)  [facility] on arrival and at end of treatment.   -LR     Row Name 02/13/20 1028          Transfer Assessment/Treatment    Comment (Transfers)  Demonstrates correct hand placement with t/f. Cues to step R LE out before t/f for comfort. Assisted to and from bathroom and to sink for hand hygiene.   -LR     Row Name 02/13/20 1028          Sit-Stand Transfer    Sit-Stand [place] (Transfers)  verbal cues;contact guard  -LR     Assistive Device (Sit-Stand Transfers)  walker, front-wheeled  -LR     Row Name 02/13/20 1028          Gait/Stairs Assessment/Training    05262 - Gait Training Minutes   10  -LR     [place] Level (Gait)  verbal cues;contact guard  -LR     Assistive Device (Gait)  walker, front-wheeled  -LR     Distance in Feet (Gait)  200  -LR     Pattern (Gait)  step-through  -LR     Deviations/Abnormal Patterns (Gait)  bilateral deviations;nery decreased;gait speed decreased;stride length decreased;right sided deviations;antalgic  -LR     Bilateral Gait Deviations  forward flexed posture;heel strike decreased  -LR     Right Sided Gait Deviations  weight shift ability decreased  -LR     [place] Level (Stairs)  verbal cues;contact guard  -LR     Assistive Device (Stairs)  walker, front-wheeled  -LR     Handrail Location (Stairs)  none  -LR     Number of Steps (Stairs)  1  -LR     Ascending Technique (Stairs)  step-to-step  -LR     Descending Technique (Stairs)  step-to-step  -LR     Comment (Gait/Stairs)  Patient ambulated with step through gait pattern at slow pace. Verbal cues for increased R LE weight bearing/stance phase, decreased UE weight bearing, and upright posture. Slight improvement with cues for correction. Gait limited by pain and fatigue. Verbal cues to back up to step with RW and to use RW for UE  support. Verbal cues to step up backwards with strong LE first and down with weak LE first. No LOB or unsteadiness with stairs.   -LR     Row Name 02/13/20 1028          Mobility Assessment/Intervention    Extremity Weight-bearing Status  right lower extremity  -LR     Right Lower Extremity (Weight-bearing Status)  weight-bearing as tolerated (WBAT)  -LR       User Key  (r) = Recorded By, (t) = Taken By, (c) = Cosigned By    Initials Name Provider Type    LR Mandie Gutierrez, PT Physical Therapist        Obj/Interventions     Row Name 02/13/20 1028          Therapeutic Exercise    Lower Extremity (Therapeutic Exercise)  gluteal sets;heel slides, right;LAQ (long arc quad), right;quad sets, right;SLR (straight leg raise), right;other (see comments);marching while standing standing calf raises, mini squats  -LR     Lower Extremity Range of Motion (Therapeutic Exercise)  hip abduction/adduction, right;ankle dorsiflexion/plantar flexion, right  -LR     Exercise Type (Therapeutic Exercise)  AROM (active range of motion);isotonic contraction, concentric;isometric contraction, static;AAROM (active assistive range of motion)  -LR     Position (Therapeutic Exercise)  seated;standing  -LR     Sets/Reps (Therapeutic Exercise)  x15 reps seated ther ex, x10 reps standing ther ex  -LR     Comment (Therapeutic Exercise)  cues for technique; mod assist SLR, min assist heel slides, hip abd, LAQ  -LR       User Key  (r) = Recorded By, (t) = Taken By, (c) = Cosigned By    Initials Name Provider Type    LR Mandie Gutierrez, PT Physical Therapist        Goals/Plan     Row Name 02/13/20 1028          Bed Mobility Goal 1 (PT)    Activity/Assistive Device (Bed Mobility Goal 1, PT)  sit to supine/supine to sit  -LR     [place] Level/Cues Needed (Bed Mobility Goal 1, PT)  conditional independence  -LR     Time Frame (Bed Mobility Goal 1, PT)  long term goal (LTG);3 days  -LR     Progress/Outcomes (Bed Mobility Goal 1, PT)   goal not met;discharged from [facility] Name 02/13/20 1028          Transfer Goal 1 (PT)    Activity/Assistive Device (Transfer Goal 1, PT)  sit-to-stand/stand-to-sit;walker, rolling  -LR     [place] Level/Cues Needed (Transfer Goal 1, PT)  conditional independence  -LR     Time Frame (Transfer Goal 1, PT)  long term goal (LTG);3 days  -LR     Progress/Outcome (Transfer Goal 1, PT)  continuing progress toward goal;goal not met;discharged from [facility] Name 02/13/20 1028          Gait Training Goal 1 (PT)    Activity/Assistive Device (Gait Training Goal 1, PT)  gait (walking locomotion);walker, rolling  -LR     [place] Level (Gait Training Goal 1, PT)  conditional independence  -LR     Distance (Gait Goal 1, PT)  500 feet  -LR     Time Frame (Gait Training Goal 1, PT)  long term goal (LTG);3 days  -LR     Progress/Outcome (Gait Training Goal 1, PT)  continuing progress toward goal;goal not met;discharged from [facility] 02/13/20 1028          Stairs Goal 1 (PT)    Activity/Assistive Device (Stairs Goal 1, PT)  ascending stairs;descending stairs;step-to-step;walker, rolling  -LR     [place] Level/Cues Needed (Stairs Goal 1, PT)  contact guard assist  -LR     Time Frame (Stairs Goal 1, PT)  long term goal (LTG);3 days  -LR     Progress/Outcome (Stairs Goal 1, PT)  goal met  -LR       User Key  (r) = Recorded By, (t) = Taken By, (c) = Cosigned By    Initials Name Provider Type    LR Mandie Gutierrez, PT Physical Therapist        Clinical Impression     [facility] Name 02/13/20 1028          Pain Assessment    Additional Documentation  Pain Scale: Numbers Pre/Post-Treatment (Group)  -[facility] Name 02/13/20 1028          Pain Scale: Numbers Pre/Post-Treatment    Pain Scale: Numbers, Pretreatment  3/10  -LR     Pain Scale: Numbers, Post-Treatment  4/10  -LR     Pain Location - Side  Right  -LR     Pain Location - Orientation  anterior  -LR     Pain Location  hip  -LR      Pain Intervention(s)  Ambulation/increased activity;Repositioned;Cold applied;Medication (See MAR)  -LR     Row Name 02/13/20 1028          Plan of Care Review    Plan of Care Reviewed With  patient;spouse  -LR     Progress  improving  -LR     Row Name 02/13/20 1028          Physical Therapy Clinical Impression    Criteria for Skilled Interventions Met (PT Clinical Impression)  yes;treatment indicated  -LR     Rehab Potential (PT Clinical Summary)  good, to achieve stated therapy goals  -LR     Row Name 02/13/20 1028          Positioning and Restraints    Pre-Treatment Position  sitting in chair/recliner  -LR     Post Treatment Position  chair  -LR     In Chair  notified nsg;reclined;sitting;call light within reach;encouraged to call for assist;exit alarm on;with family/caregiver;legs elevated  -LR       User Key  (r) = Recorded By, (t) = Taken By, (c) = Cosigned By    Initials Name Provider Type    Mandie Matias, PT Physical Therapist        Outcome Measures     Row Name 02/13/20 1028          How much help from another person do you currently need...    Turning from your back to your side while in flat bed without using bedrails?  3  -LR     Moving from lying on back to sitting on the side of a flat bed without bedrails?  3  -LR     Moving to and from a bed to a chair (including a wheelchair)?  3  -LR     Standing up from a chair using your arms (e.g., wheelchair, bedside chair)?  3  -LR     Climbing 3-5 steps with a railing?  3  -LR     To walk in hospital room?  3  -LR     AM-PAC 6 Clicks Score (PT)  18  -LR     Row Name 02/13/20 1028          Functional Assessment    Outcome Measure Options  AM-PAC 6 Clicks Basic Mobility (PT)  -LR       User Key  (r) = Recorded By, (t) = Taken By, (c) = Cosigned By    Initials Name Provider Type    Mandie Matias, PT Physical Therapist          PT Recommendation and Plan  Planned Therapy Interventions (PT Eval): balance training, bed mobility  training, gait training, home exercise program, stair training, ROM (range of motion), strengthening, patient/family education, transfer training  Outcome Summary/Treatment Plan (PT)  Anticipated Equipment Needs at Discharge (PT): front wheeled walker  Anticipated Discharge Disposition (PT): home with assist, home with OP services  Plan of Care Reviewed With: patient, spouse  Progress: improving  Outcome Summary: Patient ambulated 200 feet with RW and step through gait pattern, limited by pain and fatigue. Patient climbed 1 step backwards with RW with no difficulty. CGA for all mobility. Patient has been d/c home with family and outpatient PT.     Time Calculation:   PT Charges     Row Name 02/13/20 1028             Time Calculation    Start Time  1028  -LR      PT Received On  02/13/20  -LR      PT Goal Re-Cert Due Date  02/22/20  -LR         Time Calculation- PT    Total Timed Code Minutes- PT  29 minute(s)  -LR         Timed Charges    03153 - PT Therapeutic Exercise Minutes  14  -LR      63948 - Gait Training Minutes   10  -LR      85851 - PT Therapeutic Activity Minutes  5  -LR        User Key  (r) = Recorded By, (t) = Taken By, (c) = Cosigned By    Initials Name Provider Type    LR Mandie Gutierrez, PT Physical Therapist        Therapy Charges for Today     Code Description Service Date Service Provider Modifiers Qty    30203033832 HC GAIT TRAINING EA 15 MIN 2/12/2020 Mandie Gutierrez, PT GP 1    86448723955 HC PT THER SUPP EA 15 MIN 2/12/2020 Mandie Gutierrez, PT GP 3    46367525109 HC PT EVAL MOD COMPLEXITY 3 2/12/2020 Mandie Gutierrez, PT GP 1    32814426380 HC PT THER PROC EA 15 MIN 2/13/2020 Mandie Gutierrez, PT GP 1    07135791147 HC GAIT TRAINING EA 15 MIN 2/13/2020 Mandie Gutierrez, PT GP 1          PT G-Codes  Outcome Measure Options: AM-PAC 6 Clicks Basic Mobility (PT)  AM-PAC 6 Clicks Score (PT): 18  AM-PAC 6 Clicks Score (OT): 21    PT Discharge  Summary  Anticipated Discharge Disposition (PT): home with assist, home with OP services  Reason for Discharge: Discharge from facility  Outcomes Achieved: Patient able to partially acheive established goals  Discharge Destination: Home with assist, Home with outpatient services    Mandie Gutierrez, PT  2/13/2020

## 2020-02-13 NOTE — PLAN OF CARE
Problem: Patient Care Overview  Goal: Plan of Care Review  Outcome: Ongoing (interventions implemented as appropriate)  Flowsheets (Taken 2/13/2020 0800)  Plan of Care Reviewed With: patient; spouse  Outcome Summary: Pt req min A for supine-to-sit to move R LE off bed w/ verbal cues and leg . Pt CGA for STS, mobility in [place]. Pt min A for LBD w/ min A/VC's, post-education on AE. Recommend home w/ A at discharge.

## 2020-02-13 NOTE — THERAPY EVALUATION
Acute Care - Occupational Therapy Initial Evaluation   [place]     Patient Name: Lorrie Espinosa  : 1965  MRN: [id number]  Today's Date: 2020             Admit Date: 2020       ICD-10-CM ICD-9-CM   1. Impaired mobility and ADLs Z74.09 799.89     Patient Active Problem List   Diagnosis   • Arthritis of right hip   • Hypertension   • Hyperlipidemia   • Obesity   • Prediabetes     Past Medical History:   Diagnosis Date   • Elevated cholesterol    • Hypertension    • Tattoo    • Wears glasses      Past Surgical History:   Procedure Laterality Date   • ANKLE SURGERY     • COLONOSCOPY     • TOTAL HIP ARTHROPLASTY Left    • TOTAL HIP ARTHROPLASTY Right 2020    Procedure: TOTAL HIP ARTHROPLASTY ANTERIOR RIGHT;  Surgeon: Gabe Carranza MD;  Location: [facility];  Service: Orthopedics;  Laterality: Right;          OT ASSESSMENT FLOWSHEET (last 12 hours)      Occupational Therapy Evaluation     Row Name 20 0800                   OT Evaluation Time/Intention    Subjective Information  complains of;pain  (Pended)   -MA        Document Type  evaluation  (Pended)   -MA        Mode of Treatment  individual therapy;occupational therapy  (Pended)   -MA        Patient Effort  good  (Pended)   -MA        Symptoms Noted During/After Treatment  none  (Pended)   -MA           General Information    Patient Profile Reviewed?  yes  (Pended)   -MA        Patient Observations  alert;cooperative;agree to therapy  (Pended)   -MA        Patient/Family Observations   present in room  (Pended)   -MA        General Observations of Patient  pt supine in bed on entry  (Pended)   -MA        Prior Level of Function  independent:;ADL's;driving;min assist:;cleaning;cooking;home management;shopping  (Pended)   -MA        Equipment Currently Used at Home  bath bench;walker, standard;sock aid  (Pended)  reacher; does not currently use walker  -MA        Existing Precautions/Restrictions  fall;right;hip,  anterior  (Pended)   -MA        Equipment Issued to Patient  leg   (Pended)   -MA        Risks Reviewed  patient:;LOB;increased discomfort  (Pended)   -MA        Benefits Reviewed  patient:;improve function;increase independence;increase strength;increase balance  (Pended)   -MA        Barriers to Rehab  none identified  (Pended)   -MA           Relationship/Environment    Primary Source of Support/Comfort  spouse  (Pended)   -MA        Lives With  spouse  (Pended)   -MA        Family Caregiver if Needed  spouse  (Pended)   -MA           Resource/Environmental Concerns    Current Living Arrangements  home/apartment/condo  (Pended)   -MA        Resource/Environmental Concerns  none  (Pended)   -MA        Transportation Concerns  car, none  (Pended)   -MA           Home Main Entrance    Number of Stairs, Main Entrance  three  (Pended)   -MA        Stair Railings, Main Entrance  none  (Pended)   -MA           Cognitive Assessment/Intervention- PT/OT    Orientation Status (Cognition)  oriented x 3  (Pended)   -MA        Follows Commands (Cognition)  WNL  (Pended)   -MA        Safety Deficit (Cognitive)  safety precautions awareness  (Pended)   -MA           Safety Issues, Functional Mobility    Safety Issues Affecting Function (Mobility)  positioning of assistive device;safety precaution awareness  (Pended)   -MA        Impairments Affecting Function (Mobility)  pain;strength;range of motion (ROM)  (Pended)   -MA           Mobility Assessment/Treatment    Extremity Weight-bearing Status  left lower extremity  (Pended)   -MA        Right Lower Extremity (Weight-bearing Status)  weight-bearing as tolerated (WBAT)  (Pended)   -MA           Bed Mobility Assessment/Treatment    Bed Mobility Assessment/Treatment  supine-sit  (Pended)   -MA        Supine-Sit [place] (Bed Mobility)  verbal cues;minimum assist (75% patient effort);1 person assist  (Pended)  Min A for R LE  -MA        Bed Mobility, Safety Issues   decreased use of legs for bridging/pushing  (Pended)   -MA        Assistive Device (Bed Mobility)  leg ;bed rails;head of bed elevated  (Pended)   -MA           Functional Mobility    Functional Mobility- Ind. Level  contact guard assist;1 person + 1 person to manage equipment  (Pended)   -MA        Functional Mobility- Device  rolling walker  (Pended)   -MA        Functional Mobility-Distance (Feet)  300  (Pended)  +  -MA        Functional Mobility- Safety Issues  step length decreased;weight-shifting ability decreased  (Pended)   -MA           Transfer Assessment/Treatment    Transfer Assessment/Treatment  sit-stand transfer;stand-sit transfer  (Pended)   -MA           Sit-Stand Transfer    Sit-Stand [place] (Transfers)  contact guard;verbal cues  (Pended)  VC's for hand placement   -MA        Assistive Device (Sit-Stand Transfers)  walker, front-wheeled  (Pended)   -MA           Stand-Sit Transfer    Stand-Sit [place] (Transfers)  contact guard  (Pended)   -MA        Assistive Device (Stand-Sit Transfers)  walker, front-wheeled  (Pended)   -MA           ADL Assessment/Intervention    64738 - OT Self Care/Mgmt Minutes  15  (Pended)   -MA        BADL Assessment/Intervention  lower body dressing  (Pended)  education on AE  -MA           Lower Body Dressing Assessment/Training    Lower Body Dressing [place] Level  don;doff;pants/bottoms;shoes/slippers;socks;undergarment;minimum assist (75% patient effort);verbal cues;nonverbal cues (demo/gesture)  (Pended)   -MA        Assistive Devices (Lower Body Dressing)  leg ;long-handled shoe horn;reacher;sock-aid  (Pended)   -MA        Lower Body Dressing Position  edge of bed sitting;supported sitting  (Pended)   -MA           BADL Safety/Performance    Impairments, BADL Safety/Performance  pain;range of motion  (Pended)   -MA        Skilled BADL Treatment/Intervention  adaptive equipment training;BADL process/adaptation training  (Pended)   -MA         Progress in BADL Status  improvement noted  (Pended)   -MA           General ROM    GENERAL ROM COMMENTS  BUE WFL  (Pended)   -MA           MMT (Manual Muscle Testing)    General MMT Comments  BUE grossly 5/5  (Pended)   -MA           Motor Assessment/Interventions    Additional Documentation  Balance (Group);Therapeutic Exercise (Group)  (Pended)   -MA           Therapeutic Exercise    09860 - OT Therapeutic Activity Minutes  8  (Pended)   -MA           Balance    Balance  dynamic sitting balance;dynamic standing balance  (Pended)   -MA           Dynamic Sitting Balance    Level of [place], Reaches Outside Midline (Sitting, Dynamic Balance)  supervision  (Pended)   -MA        Sitting Position, Reaches Outside Midline (Sitting, Dynamic Balance)  sitting on edge of bed  (Pended)   -MA        Comment, Reaches Outside Midline (Sitting, Dynamic Balance)  LBD w/ AE  (Pended)   -MA           Dynamic Standing Balance    Level of [place], Reaches Outside Midline (Standing, Dynamic Balance)  contact guard assist  (Pended)   -MA        Time Able to Maintain Position, Reaches Outside Midline (Standing, Dynamic Balance)  more than 5 minutes  (Pended)   -MA        Assistive Device Utilized (Supported Standing, Dynamic Balance)  walker, rolling  (Pended)   -MA           Sensory Assessment/Intervention    Sensory General Assessment  no sensation deficits identified  (Pended)  pt did not report numbnesss or tingling  -MA           Positioning and Restraints    Pre-Treatment Position  in bed  (Pended)   -MA        Post Treatment Position  chair  (Pended)   -MA        In Chair  reclined;call light within reach;encouraged to call for assist;with family/caregiver  (Pended)   -MA           Pain Assessment    Additional Documentation  Pain Scale: Numbers Pre/Post-Treatment (Group)  (Pended)   -MA           Pain Scale: Numbers Pre/Post-Treatment    Pain Scale: Numbers, Pretreatment  2/10  (Pended)   -MA        Pain Scale:  Numbers, Post-Treatment  3/10  (Pended)   -MA        Pain Location - Side  Right  (Pended)   -MA        Pain Location - Orientation  anterior  (Pended)   -MA        Pain Location  hip  (Pended)   -MA        Pre/Post Treatment Pain Comment  burning  (Pended)   -MA        Pain Intervention(s)  Repositioned;Cold pack;Ambulation/increased activity  (Pended)   -MA           Wound 02/12/20 Right anterior hip Incision    Wound - Properties Group Date first assessed: 02/12/20  -KD Side: Right  -KD Orientation: anterior  -KD Location: hip  -KD Primary Wound Type: Incision  -KD       NPWT (Negative Pressure Wound Therapy) 02/12/20 right anterior hip    NPWT (Negative Pressure Wound Therapy) - Properties Group Placement Date: 02/12/20  -KD Location: right anterior hip  -KD Additional Comments: delores 7  -KD       Clinical Impression (OT)    OT Diagnosis  impaired ADL independence  (Pended)   -MA        Patient/Family Goals Statement (OT Eval)  Pt hopes to return home  (Pended)   -MA        Criteria for Skilled Therapeutic Interventions Met (OT Eval)  yes;treatment indicated  (Pended)   -MA        Rehab Potential (OT Eval)  good, to achieve stated therapy goals  (Pended)   -MA        Therapy Frequency (OT Eval)  daily  (Pended)   -MA        Care Plan Review (OT)  evaluation/treatment results reviewed  (Pended)   -MA        Care Plan Review, Other Participant (OT Eval)  spouse  (Pended)   -MA        Anticipated Discharge Disposition (OT)  home with assist  (Pended)   -MA           Vital Signs    Pre Systolic BP Rehab  118  (Pended)   -MA        Pre Treatment Diastolic BP  63  (Pended)   -MA        Pre Patient Position  Supine  (Pended)   -MA        Intra Patient Position  Standing  (Pended)   -MA        Post Patient Position  Sitting  (Pended)   -MA           OT Goals    Bed Mobility Goal Selection (OT)  bed mobility, OT goal 1  (Pended)   -MA        Transfer Goal Selection (OT)  transfer, OT goal 1  (Pended)   -MA        Dressing  Goal Selection (OT)  dressing, OT goal 1  (Pended)   -MA           Bed Mobility Goal 1 (OT)    Activity/Assistive Device (Bed Mobility Goal 1, OT)  sit to supine  (Pended)   -MA        [place] Level/Cues Needed (Bed Mobility Goal 1, OT)  supervision required;verbal cues required  (Pended)   -MA        Time Frame (Bed Mobility Goal 1, OT)  10 days;long term goal (LTG)  (Pended)   -MA        Progress/Outcomes (Bed Mobility Goal 1, OT)  goal ongoing  (Pended)   -MA           Transfer Goal 1 (OT)    Activity/Assistive Device (Transfer Goal 1, OT)  tub  (Pended)  simulated bath bench   -MA        [place] Level/Cues Needed (Transfer Goal 1, OT)  supervision required;verbal cues required  (Pended)   -MA        Time Frame (Transfer Goal 1, OT)  10 days;long term goal (LTG)  (Pended)   -MA        Progress/Outcome (Transfer Goal 1, OT)  goal ongoing  (Pended)   -MA           Dressing Goal 1 (OT)    Activity/Assistive Device (Dressing Goal 1, OT)  lower body dressing;sock-aid;long handled shoe horn;reacher  (Pended)   -MA        [place]/Cues Needed (Dressing Goal 1, OT)  minimum assist (75% or more patient effort)  (Pended)  AE  -MA        Time Frame (Dressing Goal 1, OT)  --  (Pended)   -MA        Progress/Outcome (Dressing Goal 1, OT)  goal met  (Pended)   -MA           Living Environment    Home Accessibility  stairs to enter home;tub/shower is not walk in  (Pended)   -MA          User Key  (r) = Recorded By, (t) = Taken By, (c) = Cosigned By    Initials Name Effective Dates    Shasta Esqueda RN 06/18/19 -     Christi Cadet OT Student 12/27/19 -                OT Recommendation and Plan  Outcome Summary/Treatment Plan (OT)  Anticipated Discharge Disposition (OT): (P) home with assist  Therapy Frequency (OT Eval): (P) daily  Plan of Care Review  Plan of Care Reviewed With: (P) patient, spouse  Plan of Care Reviewed With: (P) patient, spouse  Outcome Summary: (P) Pt req min A for supine-to-sit to  move R LE off bed w/ verbal cues and leg . Pt CGA for STS, mobility in [place]. Pt min A for LBD w/ min A and VC's. Recommend home w/ A at discharge.    Outcome Measures     Row Name 02/13/20 0800             How much help from another is currently needed...    Putting on and taking off regular lower body clothing?  3  (Pended)   -MA      Bathing (including washing, rinsing, and drying)  3  (Pended)   -MA      Toileting (which includes using toilet bed pan or urinal)  3  (Pended)   -MA      Putting on and taking off regular upper body clothing  4  (Pended)   -MA      Taking care of personal grooming (such as brushing teeth)  4  (Pended)   -MA      Eating meals  4  (Pended)   -MA      AM-PAC 6 Clicks Score (OT)  21  (Pended)   -MA         Functional Assessment    Outcome Measure Options  AM-PAC 6 Clicks Daily Activity (OT)  (Pended)   -MA        User Key  (r) = Recorded By, (t) = Taken By, (c) = Cosigned By    Initials Name Provider Type    Christi Cadet OT Student OT Student          Time Calculation:   Time Calculation- OT     Row Name 02/13/20 0922 02/13/20 0800          Time Calculation- OT    OT Start Time  0800  (Pended)   -MA  --     Total Timed Code Minutes- OT  23 minute(s)  (Pended)   -MA  --     OT Received On  02/13/20  (Pended)   -MA  --     OT Goal Re-Cert Due Date  02/23/20  (Pended)   -MA  --        Timed Charges    62267 - OT Therapeutic Activity Minutes  --  8  (Pended)   -MA     97460 - OT Self Care/Mgmt Minutes  --  15  (Pended)   -MA       User Key  (r) = Recorded By, (t) = Taken By, (c) = Cosigned By    Initials Name Provider Type    Christi Cadet OT Student OT Student        Therapy Charges for Today     Code Description Service Date Service Provider Modifiers Qty    [id number]  OT THERAPEUTIC ACT EA 15 MIN 2/13/2020 Christi Murray OT Student GO 1    [id number]  OT SELF CARE/MGMT/TRAIN EA 15 MIN 2/13/2020 Christi Murray OT Student GO 1    [id number]  OT EVAL LOW  COMPLEXITY 3 2/13/2020 Christi Murray, OT Student GO 1    [id number] HC OT THER SUPP EA 15 MIN 2/13/2020 Christi Murray, OT Student GO 1               Christi Murray OT Student  2/13/2020

## 2020-02-13 NOTE — DISCHARGE SUMMARY
Patient Name: Lorrie Espinosa  MRN: [id number]  : 1965  DOS: 2020    Attending: No att. providers found    Primary Care Provider: Bam Diallo MD    Date of Admission:.2020  8:13 AM    Date of Discharge:  2020    Discharge Diagnosis:     Status post total replacement of right hip    Arthritis of right hip    Hypertension    Hyperlipidemia    Obesity    Prediabetes    Leukocytosis, mild, likely reactive    Acute blood loss anemia, mild, asymptomatic    Acute postoperative pain      Hospital Course  Patient is a 54 y.o. female presented for right total hip arthroplasty by Dr. Carranza.  She tolerated surgery well and was admitted for further medical management. Her hip has been severely painful for the last several months.  She denies use of assistive device for ambulation or recent falls.    The patient has done well postop. The patient has been able to ambulate 200 feet with PT.    The patient has had good pain control with PO pain medications.  Adjustments were made to pain medications to optimize postop pain management. Risks and benefits of opiate medications discussed with patient.    The patient was placed on DVT prophylaxis including aspirin. The patient was encouraged to use IS for atelectasis prophylaxis.     The patient was placed on a bowel regimen to prevent constipation while on pain medication.   The patient's H/H was monitored with a slight decrease that remained asymptomatic.    It is felt by all involved that the patient can discharge home at this time, and the patient has no further questions        Procedures Performed  2020     Pre-op Diagnosis:   Right hip osteoarthritis       Post-Op Diagnosis Codes:  Right hip osteoarthritis     Procedure/CPT® Codes:  15602     Procedure(s):  TOTAL HIP ARTHROPLASTY ANTERIOR RIGHT     Surgeon(s):  Gabe Carranza MD    Pertinent Test Results:    I reviewed the patient's new clinical results.   Results from last 7 days   Lab Units  "20  0631   WBC 10*3/mm3 12.90*   HEMOGLOBIN g/dL 10.5*   HEMATOCRIT % 33.0*   PLATELETS 10*3/mm3 289     Results for HANS TRIVEDI (MRN [id number]) as of 2020 15:32   Ref. Range 2020 15:46   Hemoglobin Latest Ref Range: 12.0 - 15.9 g/dL 12.7   Hematocrit Latest Ref Range: 34.0 - 46.6 % 39.4     Results from last 7 days   Lab Units 20  0631 20  0945   SODIUM mmol/L 136  --    POTASSIUM mmol/L 3.8 3.6   CHLORIDE mmol/L 99  --    CO2 mmol/L 27.0  --    BUN mg/dL 16  --    CREATININE mg/dL 0.64  --    CALCIUM mg/dL 8.5*  --    GLUCOSE mg/dL 138*  --      Results for HANS TRIVEDI (MRN [id number]) as of 2020 15:32   Ref. Range 2020 20:05 2020 06:31 2020 07:23 2020 11:37   Glucose Latest Ref Range: 70 - 130 mg/dL 135 (H) 138 (H) 124 117     I reviewed the patient's new imaging including images and reports.      Physical therapy: Patient ambulated 200 feet with RW and step through gait pattern, limited by pain and fatigue. Patient climbed 1 step backwards with RW with no difficulty. CGA for all mobility. Patient has been d/c home with family and outpatient PT.    Discharge Assessment:       Visit Vitals  /62 (BP Location: Right arm, Patient Position: Sitting)   Pulse 86   Temp 98.2 °F (36.8 °C) (Oral)   Resp 18   Ht 170.2 cm (67\")   Wt (!) 147 kg (324 lb 1.2 oz)   SpO2 96%   BMI 50.76 kg/m²     Temp (24hrs), Av.9 °F (36.6 °C), Min:97.5 °F (36.4 °C), Max:98.2 °F (36.8 °C)      General Appearance:    Alert, cooperative, in no acute distress   Lungs:     Clear to auscultation,respirations regular, even and                   unlabored    Heart:    Regular rhythm and normal rate, normal S1 and S2   Abdomen:     Normal bowel sounds, no masses, no organomegaly, soft        non-tender, non-distended, no guarding, no rebound                 Tenderness. obese   Extremities:   Moves all extremities well, no edema, no cyanosis, no              Redness. Right hip " ATIYA dressing CDI   Pulses:   Pulses palpable and equal bilaterally   Skin:   No bleeding, bruising or rash   Neurologic:   Cranial nerves 2 - 12 grossly intact, sensation intact. Flexion and dorsiflexion intact bilateral feet.       Discharge Disposition: Home    Discharge Medications     Discharge Medications      New Medications      Instructions Start Date   HYDROcodone-acetaminophen 5-325 MG per tablet  Commonly known as:  NORCO  Replaces:  HYDROcodone-acetaminophen 7.5-325 MG per tablet   1 tablet, Oral, Every 4 Hours PRN      STOOL SOFTENER 100 MG capsule  Generic drug:  docusate sodium   100 mg, Oral, 2 Times Daily         Changes to Medications      Instructions Start Date   aspirin 81 MG EC tablet  What changed:  additional instructions   81 mg, Oral, Daily, Resume in 1 month      aspirin 325 MG tablet  What changed:  You were already taking a medication with the same name, and this prescription was added. Make sure you understand how and when to take each.   325 mg, Oral, Daily, For 1 month   Start Date:  February 14, 2020     Diclofenac 18 MG capsule  What changed:    · medication strength  · how much to take  · additional instructions   35 mg, Oral, 2 Times Daily, Must take an hour after aspirin if needed.         Continue These Medications      Instructions Start Date   acetaminophen 500 MG tablet  Commonly known as:  TYLENOL   500 mg, Oral, Every 6 Hours PRN      amLODIPine 5 MG tablet  Commonly known as:  NORVASC   5 mg, Oral, Daily      B-12 PO   500 mcg, Oral, Daily      gabapentin 400 MG capsule  Commonly known as:  NEURONTIN   400 mg, Oral, As Needed      LORATADINE PO   10 mg, Oral, Daily      rosuvastatin 20 MG tablet  Commonly known as:  CRESTOR   20 mg, Oral, Daily      valsartan-hydrochlorothiazide 320-12.5 MG per tablet  Commonly known as:  DIOVAN-HCT   1 tablet, Oral, Daily      vitamin d 125 MCG (5000 UT) capsule  Commonly known as:  CHOLECALIFEROL   5,000 Units, Oral, Daily         Stop  These Medications    HYDROcodone-acetaminophen 7.5-325 MG per tablet  Commonly known as:  NORCO  Replaced by:  HYDROcodone-acetaminophen 5-325 MG per tablet            Discharge Diet: Consistent carb diet    Activity at Discharge: WBAT RLE  Per Dr. Carranza: Patient is advised on routine anterior hip precautions.  She should sleep with 1 or 2 pillows under right knee and leg    Follow-up Appointments  Dr. Carranza per his orders    Discharge took over 30 min.    SUMAN Frias  02/13/20  3:32 PM     I have personally performed the evaluation on this patient. My history is consistant  with above documentation . My exam finding are listed above. I have personally reviewed and discussed the above formulated discharge plan with patient and Joey

## 2020-02-13 NOTE — PROGRESS NOTES
"Lorrie Espinosa  [id number]  1965    /62 (BP Location: Right arm, Patient Position: Sitting)   Pulse 86   Temp 98.2 °F (36.8 °C) (Oral)   Resp 18   Ht 170.2 cm (67\")   Wt (!) 147 kg (324 lb 1.2 oz)   SpO2 96%   BMI 50.76 kg/m²     Lab Results (last 24 hours)     Procedure Component Value Units Date/Time    POC Glucose Once [[id number]]  (Normal) Collected:  02/13/20 1137    Specimen:  Blood Updated:  02/13/20 1145     Glucose 117 mg/dL     Basic Metabolic Panel [[id number]]  (Abnormal) Collected:  02/13/20 0631    Specimen:  Blood Updated:  02/13/20 0815     Glucose 138 mg/dL      BUN 16 mg/dL      Creatinine 0.64 mg/dL      Sodium 136 mmol/L      Potassium 3.8 mmol/L      Chloride 99 mmol/L      CO2 27.0 mmol/L      Calcium 8.5 mg/dL      eGFR Non African Amer 96 mL/min/1.73      BUN/Creatinine Ratio 25.0     Anion Gap 10.0 mmol/L     Narrative:       GFR Normal >60  Chronic Kidney Disease <60  Kidney Failure <15      POC Glucose Once [[id number]]  (Normal) Collected:  02/13/20 0723    Specimen:  Blood Updated:  02/13/20 0725     Glucose 124 mg/dL     CBC & Differential [[id number]] Collected:  02/13/20 0631    Specimen:  Blood Updated:  02/13/20 0713    Narrative:       The following orders were created for panel order CBC & Differential.  Procedure                               Abnormality         Status                     ---------                               -----------         ------                     CBC Auto Differential[[id number]]        Abnormal            Final result                 Please view results for these tests on the individual orders.    CBC Auto Differential [[id number]]  (Abnormal) Collected:  02/13/20 0631    Specimen:  Blood Updated:  02/13/20 0713     WBC 12.90 10*3/mm3      RBC 3.45 10*6/mm3      Hemoglobin 10.5 g/dL      Hematocrit 33.0 %      MCV 95.7 fL      MCH 30.4 pg      MCHC 31.8 g/dL      RDW 13.8 %      RDW-SD 47.9 fl      MPV 10.0 fL      Platelets 289 " 10*3/mm3      Neutrophil % 86.4 %      Lymphocyte % 5.0 %      Monocyte % 6.3 %      Eosinophil % 1.8 %      Basophil % 0.1 %      Immature Grans % 0.4 %      Neutrophils, Absolute 11.15 10*3/mm3      Lymphocytes, Absolute 0.65 10*3/mm3      Monocytes, Absolute 0.81 10*3/mm3      Eosinophils, Absolute 0.23 10*3/mm3      Basophils, Absolute 0.01 10*3/mm3      Immature Grans, Absolute 0.05 10*3/mm3      nRBC 0.0 /100 WBC     POC Glucose Once [[id number]]  (Abnormal) Collected:  02/12/20 2005    Specimen:  Blood Updated:  02/12/20 2010     Glucose 135 mg/dL     POC Glucose Once [[id number]]  (Abnormal) Collected:  02/12/20 1606    Specimen:  Blood Updated:  02/12/20 1608     Glucose 154 mg/dL           Patient Care Team:  Bam Diallo MD as PCP - General  Bam Diallo MD as PCP - Family Medicine    SUBJECTIVE  Pain well controlled.  Good start in physical therapy.    PHYSICAL EXAM  Incision benign  Minimal thigh swelling  Neurovascularly intact to knees and toes       Status post total replacement of right hip    Arthritis of right hip    Hypertension    Hyperlipidemia    Obesity    Prediabetes      PLAN / DISPOSITION:  Right hip stable after closed reduction anterior dislocation.  Patient is advised on routine anterior hip precautions.  She should sleep with 1 or 2 pillows under right knee and leg.    Hemoglobin stable - no transfusion anticipated    Discharge home today    Gabe Carranza MD  02/13/20  12:55 PM

## 2020-02-13 NOTE — PLAN OF CARE
Problem: Patient Care Overview  Goal: Plan of Care Review  Outcome: Ongoing (interventions implemented as appropriate)  Flowsheets (Taken 2/13/2020 1028)  Outcome Summary: Patient ambulated 200 feet with RW and step through gait pattern, limited by pain and fatigue. Patient climbed 1 step backwards with RW with no difficulty. CGA for all mobility. Patient has been d/c home with family and outpatient PT.

## 2020-02-13 NOTE — PROGRESS NOTES
Discharge Planning Assessment  [facility]     Patient Name: Lorrie Espinosa  MRN: [id number]  Today's Date: 2/13/2020    Admit Date: 2/12/2020    Discharge Needs Assessment     Row Name 02/13/20 1152       Living Environment    Lives With  spouse    Name(s) of Who Lives With Patient  Milad Espinosa [phone]    Current Living Arrangements  home/apartment/condo    Primary Care Provided by  self    Provides Primary Care For  no one    Family Caregiver if Needed  spouse    Family Caregiver Names  Milad ( edward)    Quality of Family Relationships  helpful;involved;supportive    Able to Return to Prior Arrangements  yes       Resource/Environmental Concerns    Resource/Environmental Concerns  home accessibility    Home Accessibility Concerns  stairs to enter home    Transportation Concerns  car, none       Transition Planning    Patient/Family Anticipates Transition to  home with family    Patient/Family Anticipated Services at Transition  outpatient care    Transportation Anticipated  family or friend will provide       Discharge Needs Assessment    Readmission Within the Last 30 Days  no previous admission in last 30 days    Concerns to be Addressed  discharge planning    Equipment Currently Used at Home  shower chair also has toilet extension seat    Anticipated Changes Related to Illness  inability to work    Equipment Needed After Discharge  walker, rolling    Outpatient/Agency/Support Group Needs  outpatient therapy    Discharge Facility/Level of Care Needs  outpatient therapy        Discharge Plan     Row Name 02/13/20 1200       Plan    Plan  Home with outpt PT    Provided post acute provider list?  Yes    Post Acute Provider List  Home Health    Delivered To  Patient    Method of Delivery  In person    Patient/Family in Agreement with Plan  yes    Plan Comments  I met with Mrs Espinosa and  at bedside to discuss d/c plan. Her plan is to return home.  will be available to assist. Prior to  admit she was independent with all ADL's, working full time,etc. She will need a rolling walker. Arrangements made for  one to be delivered to room by [employer] [facility]. She already has a toilet extension seat. We discussed Home health options for PT,. she has decided she would prefer outpt PT. Arrangements made for outpt PT at [facility] out[facility] PT( in network with her insurance) dept on 2/18 at 1545. She is in agreement with plan. No other d/c needs identified.    Final Discharge Disposition Code  01 - home or self-care        Destination      Coordination has not been started for this encounter.      Durable Medical Equipment      Coordination has not been started for this encounter.      Dialysis/Infusion      Coordination has not been started for this encounter.      Home Medical Care      Coordination has not been started for this encounter.      Therapy      Coordination has not been started for this encounter.      Community Resources      Coordination has not been started for this encounter.        Expected Discharge Date and Time     Expected Discharge Date Expected Discharge Time    Feb 13, 2020         Demographic Summary     Row Name 02/13/20 1148       General Information    Admission Type  inpatient    Arrived From  home    Referral Source  physician    Reason for Consult  discharge planning    Preferred Language  English    General Information Comments  PCP- Bam Diallo       Contact Information    Permission Granted to Share Info With  ;family/designee        Functional Status     Row Name 02/13/20 1150       Functional Status    Usual Activity Tolerance  good    Current Activity Tolerance  -- see PT notes       Functional Status, IADL    Medications  independent    Meal Preparation  independent    Housekeeping  independent    Laundry  independent    Shopping  independent       Mental Status    General Appearance WDL  WDL       Mental Status Summary    Recent Changes in Mental  Status/Cognitive Functioning  no changes       Employment/    Employment Status  employed full time    Shift Worked  first shift    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       Abuse/Neglect    No documentation.       Legal    No documentation.       Substance Abuse    No documentation.       Patient Forms    No documentation.           Sonja C Kellerman, RN

## 2020-02-16 LAB
ABO + RH BLD: NORMAL
ABO + RH BLD: NORMAL
BH BB BLOOD EXPIRATION DATE: NORMAL
BH BB BLOOD EXPIRATION DATE: NORMAL
BH BB BLOOD TYPE BARCODE: 7300
BH BB BLOOD TYPE BARCODE: 7300
BH BB DISPENSE STATUS: NORMAL
BH BB DISPENSE STATUS: NORMAL
BH BB PRODUCT CODE: NORMAL
BH BB PRODUCT CODE: NORMAL
BH BB UNIT NUMBER: NORMAL
BH BB UNIT NUMBER: NORMAL
UNIT  ABO: NORMAL
UNIT  ABO: NORMAL
UNIT  RH: NORMAL
UNIT  RH: NORMAL

## 2020-02-18 ENCOUNTER — TREATMENT (OUTPATIENT)
Dept: PHYSICAL THERAPY | Facility: CLINIC | Age: 55
End: 2020-02-18

## 2020-02-18 DIAGNOSIS — Z96.641 STATUS POST TOTAL HIP REPLACEMENT, RIGHT: Primary | ICD-10-CM

## 2020-02-18 DIAGNOSIS — M16.11 ARTHRITIS OF RIGHT HIP: ICD-10-CM

## 2020-02-18 PROCEDURE — 97110 THERAPEUTIC EXERCISES: CPT | Performed by: PHYSICAL THERAPIST

## 2020-02-18 PROCEDURE — 97162 PT EVAL MOD COMPLEX 30 MIN: CPT | Performed by: PHYSICAL THERAPIST

## 2020-02-18 NOTE — PROGRESS NOTES
Physical Therapy Initial Evaluation and Plan of Care    Patient: Lorrie Espinosa   : 1965  Diagnosis/ICD-10 Code:  Status post total hip replacement, right [Z96.641]  Referring practitioner: No ref. provider found  Date of Initial Visit: 2020  Today's Date: 2020  Patient seen for 1 sessions           Subjective Questionnaire: LEFS: 80-85% impaired      Subjective Evaluation    History of Present Illness  Date of surgery: 2020  Mechanism of injury: Patient reports that she underwent a right anterior total hip replacement on 2020.  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  She states that she had previously had avascular necrosis and a total hip replacement in the left hip approximately 6 years ago.  She states that since her surgery, pain is improving.  She does continue to note swelling, but states that it is improving; she notes that she applies ice frequently.  She currently ambulates with the rolling walker.        Patient Occupation: [employer]  Current pain ratin  At best pain ratin  At worst pain rating: 10  Location: Right hip  Quality: burning  Relieving factors: ice, change in position and rest  Aggravating factors: standing, ambulation and prolonged positioning    Patient Goals  Patient goals for therapy: decreased pain, increased motion and increased strength             Objective       Observations     Additional Observation Details  ATIYA vacuum and drain noted at incision site, with patient stating that it will be removed tomorrow (2020) per MD instructions.  Patient displays mild bruising at right thigh. Minimal redness noted near incision site.  No warmth is noted near incision site or throughout the right thigh.  Incision site covered with dressing-patient noted that she was instructed to not remove dressing until after her 3 week  follow-up appt.    Active Range of Motion     Right Hip   Flexion: 45 degrees with pain  Abduction: 10 degrees with pain    Strength/Myotome Testing     Left Hip   Planes of Motion   Flexion: 4    Left Knee   Flexion: 4+  Extension: 4+    Additional Strength Details  R) LE MMT not performed due to post surgery         Assessment & Plan     Assessment  Impairments: abnormal gait, abnormal muscle firing, abnormal or restricted ROM, activity intolerance, impaired balance, impaired physical strength, lacks appropriate home exercise program, pain with function and weight-bearing intolerance  Assessment details: Patient is a 54 year old female who comes to physical therapy s/p right total hip replacement. The patient presents with increased pain, decreased right hip ROM, and decreased LE strength. Patient reports a 85% functional mobility impairment, based on the patient's response to the LEFS.  Patient will benefit from skilled PT, so that patient can achieve maximum level of function.     Prognosis: good  Functional Limitations: sleeping, walking, uncomfortable because of pain, moving in bed, sitting, standing and stooping  Goals  Plan Goals: SHORT TERM GOALS:  4 weeks       1. Patient will be independent/compliant with HEP.  2. Right hip ROM will improve by at least 15 degrees to allow for greater ease with ADLs.   3. Patient will report pain no greater than 5/10 for the right hip when performing self-care activities with minimal modifications.    LONG TERM GOALS:   12 weeks  1. Pt will report at least 40/ 80 on LEFS to show improved functional mobility.   2. Pt will demonstrate right hip strength of 4/5 or greater to improve safety with ambulation on uneven surfaces  3. Pt to report being able to walk for 15 minutes with pain no greater than 3/10 with equal weightbearing.  4. Right hip ROM will improve to WFL to allow for greater ease with ADLs.      Plan  Therapy options: will be seen for skilled physical therapy  services  Planned modality interventions: cryotherapy and thermotherapy (hydrocollator packs)  Planned therapy interventions: manual therapy, balance/weight-bearing training, neuromuscular re-education, postural training, body mechanics training, soft tissue mobilization, flexibility, spinal/joint mobilization, functional ROM exercises, strengthening, gait training, stretching, home exercise program, therapeutic activities, IADL retraining, transfer training and joint mobilization  Duration in visits: 20  Duration in weeks: 12  Treatment plan discussed with: patient  Plan details: Moderate Evaluation  70298  High Evaluation   43159    Therapeutic exercise  77874  Therapeutic activity    59347  Neuromuscular re-education   13838  Manual therapy   34152  Gait training  93672    Moist heat/cryotherapy 57214             Visit Diagnoses:    ICD-10-CM ICD-9-CM   1. Status post total hip replacement, right Z96.641 V43.64   2. Arthritis of right hip M16.11 716.95       Timed:  Manual Therapy:         mins  55940;  Therapeutic Exercise:    14     mins  16826;     Neuromuscular Sky:        mins  43346;    Therapeutic Activity:          mins  06369;     Gait Training:           mins  84211;     Ultrasound:          mins  57260;    Electrical Stimulation:         mins  91164 ( );    Untimed:  Electrical Stimulation:         mins  69960 ( );  Mechanical Traction:         mins  17022;     Timed Treatment:   14   mins   Total Treatment:     44   mins    PT SIGNATURE: July Cramer, PT   DATE TREATMENT INITIATED: 2/18/2020    Initial Certification  Certification Period: 5/18/2020  I certify that the therapy services are furnished while this patient is under my care.  The services outlined above are required by this patient, and will be reviewed every 90 days.     PHYSICIAN:       DATE:     Please sign and return via fax to [phone].. Thank you, [facility].

## 2020-02-26 ENCOUNTER — TREATMENT (OUTPATIENT)
Dept: PHYSICAL THERAPY | Facility: CLINIC | Age: 55
End: 2020-02-26

## 2020-02-26 DIAGNOSIS — M16.11 ARTHRITIS OF RIGHT HIP: ICD-10-CM

## 2020-02-26 DIAGNOSIS — Z96.641 STATUS POST TOTAL HIP REPLACEMENT, RIGHT: Primary | ICD-10-CM

## 2020-02-26 PROCEDURE — 97110 THERAPEUTIC EXERCISES: CPT | Performed by: PHYSICAL THERAPIST

## 2020-02-26 NOTE — PROGRESS NOTES
Physical Therapy Daily Progress Note      Patient: Lorrie Espinosa   : 1965  Referring practitioner: No ref. provider found  Date of Initial Visit: Type: THERAPY  Noted: 2020  Today's Date: 2020  Patient seen for 2 sessions           Subjective Evaluation    History of Present Illness    Subjective comment: Patient reports 1/10 pain today.  She notes that she took pain medication prior to today's session.  She reports that her bandage is starting to come loose; she also notes that she has a reddened area under her stomach fold.Pain  Current pain ratin (pre- and post-tx)           Objective   See Exercise, Manual, and Modality Logs for complete treatment.       Assessment & Plan     Assessment  Assessment details: Patient tolerated today's session well, with reports of no increase in pain following session.  There ex focused on hip ROM and LE strengthening.  Cues were provided to ensure correct form with exercises.  Session was concluded with cryotherapy.  Patient displayed reddened, dried abrasion along right abdominal fold that measured approximately 2x4 cm; no drainage noted. Patient reported that she has been placing a dry washcloth between the skin to help prevent friction and moisture.  Hip bandage was assessed, due to patient's reports that the bandage was coming loose.  Bandage was noted to be peeling up on the edges of the medial portion of the bandage near the upper thigh; bandage was not reinforced down due to close proximity to abrasion at right abdominal fold.  Patient was instructed to contact referring MD regarding the abrasion and her bandage coming loose; she verbalized understanding.  She will continue to be progressed per her tolerance and POC.        Visit Diagnoses:    ICD-10-CM ICD-9-CM   1. Status post total hip replacement, right Z96.641 V43.64   2. Arthritis of right hip M16.11 716.95       Progress per Plan of Care and Progress strengthening /stabilization  /functional activity           Timed:  Manual Therapy:         mins  49904;  Therapeutic Exercise:    32     mins  94670;     Neuromuscular Sky:        mins  98988;    Therapeutic Activity:          mins  68587;     Gait Training:           mins  68611;     Ultrasound:          mins  50711;    Electrical Stimulation:         mins  61634 ( );    Untimed:  Electrical Stimulation:         mins  77059 ( );  Mechanical Traction:         mins  38018;     Timed Treatment:   32   mins   Total Treatment:     40   mins  July Cramer, PT  Physical Therapist

## 2020-02-28 ENCOUNTER — TREATMENT (OUTPATIENT)
Dept: PHYSICAL THERAPY | Facility: CLINIC | Age: 55
End: 2020-02-28

## 2020-02-28 DIAGNOSIS — M16.11 ARTHRITIS OF RIGHT HIP: ICD-10-CM

## 2020-02-28 DIAGNOSIS — Z96.641 STATUS POST TOTAL HIP REPLACEMENT, RIGHT: Primary | ICD-10-CM

## 2020-02-28 PROCEDURE — 97110 THERAPEUTIC EXERCISES: CPT | Performed by: PHYSICAL THERAPIST

## 2020-02-28 NOTE — PROGRESS NOTES
Physical Therapy Daily Progress Note      Patient: Lorrie Espinosa   : 1965  Referring practitioner: No ref. provider found  Date of Initial Visit: Type: THERAPY  Noted: 2020  Today's Date: 2020  Patient seen for 3 sessions           Subjective     Objective   See Exercise, Manual, and Modality Logs for complete treatment.       Assessment & Plan     Assessment  Assessment details: Patient tolerated today's session well, with reports of decreased pain following session.  There ex progressed to include increased repetitions and the addition of new exercises.  Patient required occasional rest breaks due to fatigue and pain; she noted that pain had eased with rest.  Patient will continue to be progressed per her tolerance and POC.        Visit Diagnoses:    ICD-10-CM ICD-9-CM   1. Status post total hip replacement, right Z96.641 V43.64   2. Arthritis of right hip M16.11 716.95       Progress per Plan of Care and Progress strengthening /stabilization /functional activity           Timed:  Manual Therapy:         mins  13377;  Therapeutic Exercise:    40     mins  46990;     Neuromuscular Sky:        mins  82345;    Therapeutic Activity:          mins  02037;     Gait Training:           mins  81620;     Ultrasound:          mins  91426;    Electrical Stimulation:         mins  11549 ( );    Untimed:  Electrical Stimulation:         mins  18412 ( );  Mechanical Traction:         mins  32442;     Timed Treatment:   40   mins   Total Treatment:     48   mins  July Cramer, PT  Physical Therapist

## 2020-03-04 ENCOUNTER — DOCUMENTATION (OUTPATIENT)
Dept: PHYSICAL THERAPY | Facility: CLINIC | Age: 55
End: 2020-03-04

## 2020-03-04 DIAGNOSIS — Z96.641 STATUS POST TOTAL HIP REPLACEMENT, RIGHT: Primary | ICD-10-CM

## 2020-03-04 DIAGNOSIS — M16.11 ARTHRITIS OF RIGHT HIP: ICD-10-CM

## 2020-03-04 NOTE — PROGRESS NOTES
Discharge Summary  Discharge Summary from Physical Therapy Report    Patient Information  Lorrie Espinosa  1965    Dates  PT visit: 2/18/2020-2/28/2020  Number of Visits: 3       Goals: Unknown, unable to assess due to unplanned discharge    Visit Diagnoses:    ICD-10-CM ICD-9-CM   1. Status post total hip replacement, right Z96.641 V43.64   2. Arthritis of right hip M16.11 716.95       Discharge Plan: Continue with current home exercise program as instructed    Comments Patient called clinic on 3/4/2020 and noted that she saw MD on 3/3/2020; she states that MD told her she can be discharged from PT.  Patient has been instructed to continue with her HEP.  Goals/outcome measures could not be assessed due to unplanned discharge.    Date of Discharge 3/4/2020        July Cramer, PT  Physical Therapist

## 2020-05-12 ENCOUNTER — HOSPITAL ENCOUNTER (OUTPATIENT)
Dept: BONE DENSITY | Facility: HOSPITAL | Age: 55
Discharge: HOME | End: 2020-05-12
Admitting: INTERNAL MEDICINE

## 2020-05-12 DIAGNOSIS — Z13.820 SPECIAL SCREENING FOR OSTEOPOROSIS: ICD-10-CM

## 2020-05-12 PROCEDURE — 77080 DXA BONE DENSITY AXIAL: CPT | Performed by: RADIOLOGY

## 2020-05-12 PROCEDURE — 77080 DXA BONE DENSITY AXIAL: CPT

## 2020-11-04 ENCOUNTER — TRANSCRIBE ORDERS (OUTPATIENT)
Dept: ADMINISTRATIVE | Facility: HOSPITAL | Age: 55
End: 2020-11-04

## 2020-11-04 DIAGNOSIS — R01.1 CARDIAC MURMUR: Primary | ICD-10-CM

## 2020-11-09 ENCOUNTER — HOSPITAL ENCOUNTER (OUTPATIENT)
Dept: CARDIOLOGY | Facility: HOSPITAL | Age: 55
Discharge: HOME | End: 2020-11-09
Admitting: INTERNAL MEDICINE

## 2020-11-09 DIAGNOSIS — R01.1 CARDIAC MURMUR: ICD-10-CM

## 2020-11-09 PROCEDURE — 93306 TTE W/DOPPLER COMPLETE: CPT | Performed by: INTERNAL MEDICINE

## 2020-11-09 PROCEDURE — 93306 TTE W/DOPPLER COMPLETE: CPT

## 2020-11-10 LAB
BH CV ECHO MEAS - % IVS THICK: 15.8 %
BH CV ECHO MEAS - % LVPW THICK: 35.3 %
BH CV ECHO MEAS - ACS: 2.2 CM
BH CV ECHO MEAS - AO MAX PG: 16.2 MMHG
BH CV ECHO MEAS - AO MEAN PG: 8 MMHG
BH CV ECHO MEAS - AO ROOT AREA (BSA CORRECTED): 1
BH CV ECHO MEAS - AO ROOT AREA: 4.9 CM^2
BH CV ECHO MEAS - AO ROOT DIAM: 2.5 CM
BH CV ECHO MEAS - AO V2 MAX: 201 CM/SEC
BH CV ECHO MEAS - AO V2 MEAN: 130 CM/SEC
BH CV ECHO MEAS - AO V2 VTI: 40.7 CM
BH CV ECHO MEAS - BSA(HAYCOCK): 2.7 M^2
BH CV ECHO MEAS - BSA: 2.5 M^2
BH CV ECHO MEAS - BZI_BMI: 50.7 KILOGRAMS/M^2
BH CV ECHO MEAS - BZI_METRIC_HEIGHT: 170.2 CM
BH CV ECHO MEAS - BZI_METRIC_WEIGHT: 147 KG
BH CV ECHO MEAS - EDV(CUBED): 139 ML
BH CV ECHO MEAS - EDV(MOD-SP4): 70.1 ML
BH CV ECHO MEAS - EDV(TEICH): 128.4 ML
BH CV ECHO MEAS - EF(CUBED): 69.2 %
BH CV ECHO MEAS - EF(MOD-SP4): 60.5 %
BH CV ECHO MEAS - EF(TEICH): 60.4 %
BH CV ECHO MEAS - ESV(CUBED): 42.9 ML
BH CV ECHO MEAS - ESV(MOD-SP4): 27.7 ML
BH CV ECHO MEAS - ESV(TEICH): 50.9 ML
BH CV ECHO MEAS - FS: 32.4 %
BH CV ECHO MEAS - IVS/LVPW: 1.1
BH CV ECHO MEAS - IVSD: 1.3 CM
BH CV ECHO MEAS - IVSS: 1.5 CM
BH CV ECHO MEAS - LA DIMENSION: 3.1 CM
BH CV ECHO MEAS - LA/AO: 1.2
BH CV ECHO MEAS - LV DIASTOLIC VOL/BSA (35-75): 28.2 ML/M^2
BH CV ECHO MEAS - LV MASS(C)D: 264.8 GRAMS
BH CV ECHO MEAS - LV MASS(C)DI: 106.7 GRAMS/M^2
BH CV ECHO MEAS - LV MASS(C)S: 209.6 GRAMS
BH CV ECHO MEAS - LV MASS(C)SI: 84.4 GRAMS/M^2
BH CV ECHO MEAS - LV SYSTOLIC VOL/BSA (12-30): 11.2 ML/M^2
BH CV ECHO MEAS - LVIDD: 5.2 CM
BH CV ECHO MEAS - LVIDS: 3.5 CM
BH CV ECHO MEAS - LVLD AP4: 6.6 CM
BH CV ECHO MEAS - LVLS AP4: 5.9 CM
BH CV ECHO MEAS - LVOT AREA (M): 2.3 CM^2
BH CV ECHO MEAS - LVOT AREA: 2.3 CM^2
BH CV ECHO MEAS - LVOT DIAM: 1.7 CM
BH CV ECHO MEAS - LVPWD: 1.2 CM
BH CV ECHO MEAS - LVPWS: 1.6 CM
BH CV ECHO MEAS - MV A MAX VEL: 124 CM/SEC
BH CV ECHO MEAS - MV E MAX VEL: 82 CM/SEC
BH CV ECHO MEAS - MV E/A: 0.66
BH CV ECHO MEAS - PA ACC TIME: 0.12 SEC
BH CV ECHO MEAS - PA PR(ACCEL): 26.8 MMHG
BH CV ECHO MEAS - RAP SYSTOLE: 10 MMHG
BH CV ECHO MEAS - RVSP: 47.5 MMHG
BH CV ECHO MEAS - SI(AO): 80.5 ML/M^2
BH CV ECHO MEAS - SI(CUBED): 38.7 ML/M^2
BH CV ECHO MEAS - SI(MOD-SP4): 17.1 ML/M^2
BH CV ECHO MEAS - SI(TEICH): 31.2 ML/M^2
BH CV ECHO MEAS - SV(AO): 199.8 ML
BH CV ECHO MEAS - SV(CUBED): 96.1 ML
BH CV ECHO MEAS - SV(MOD-SP4): 42.4 ML
BH CV ECHO MEAS - SV(TEICH): 77.5 ML
BH CV ECHO MEAS - TR MAX VEL: 306 CM/SEC
MAXIMAL PREDICTED HEART RATE: 164 BPM
STRESS TARGET HR: 139 BPM

## 2021-03-18 ENCOUNTER — BULK ORDERING (OUTPATIENT)
Dept: CASE MANAGEMENT | Facility: OTHER | Age: 56
End: 2021-03-18

## 2021-03-18 DIAGNOSIS — Z23 IMMUNIZATION DUE: ICD-10-CM

## 2021-07-01 ENCOUNTER — TRANSCRIBE ORDERS (OUTPATIENT)
Dept: ADMINISTRATIVE | Facility: HOSPITAL | Age: 56
End: 2021-07-01

## 2021-07-01 DIAGNOSIS — H46.8 OTHER OPTIC NEURITIS: Primary | ICD-10-CM

## 2021-07-07 ENCOUNTER — TRANSCRIBE ORDERS (OUTPATIENT)
Dept: LAB | Facility: HOSPITAL | Age: 56
End: 2021-07-07

## 2021-07-07 ENCOUNTER — LAB (OUTPATIENT)
Dept: LAB | Facility: HOSPITAL | Age: 56
End: 2021-07-07

## 2021-07-07 DIAGNOSIS — H46.8 OTHER OPTIC NEURITIS: ICD-10-CM

## 2021-07-07 DIAGNOSIS — H46.8 OTHER OPTIC NEURITIS: Primary | ICD-10-CM

## 2021-07-07 LAB
ALBUMIN SERPL-MCNC: 4 G/DL (ref 3.5–5.2)
ALBUMIN/GLOB SERPL: 1.1 G/DL
ALP SERPL-CCNC: 82 U/L (ref 39–117)
ALT SERPL W P-5'-P-CCNC: 13 U/L (ref 1–33)
ANION GAP SERPL CALCULATED.3IONS-SCNC: 9 MMOL/L (ref 5–15)
AST SERPL-CCNC: 19 U/L (ref 1–32)
BASOPHILS # BLD AUTO: 0.04 10*3/MM3 (ref 0–0.2)
BASOPHILS NFR BLD AUTO: 0.4 % (ref 0–1.5)
BILIRUB SERPL-MCNC: 0.4 MG/DL (ref 0–1.2)
BUN SERPL-MCNC: 15 MG/DL (ref 6–20)
BUN/CREAT SERPL: 20.8 (ref 7–25)
CALCIUM SPEC-SCNC: 9.1 MG/DL (ref 8.6–10.5)
CHLORIDE SERPL-SCNC: 103 MMOL/L (ref 98–107)
CHOLEST SERPL-MCNC: 119 MG/DL (ref 0–200)
CO2 SERPL-SCNC: 28 MMOL/L (ref 22–29)
CREAT SERPL-MCNC: 0.72 MG/DL (ref 0.57–1)
DEPRECATED RDW RBC AUTO: 50.6 FL (ref 37–54)
EOSINOPHIL # BLD AUTO: 0.31 10*3/MM3 (ref 0–0.4)
EOSINOPHIL NFR BLD AUTO: 3.4 % (ref 0.3–6.2)
ERYTHROCYTE [DISTWIDTH] IN BLOOD BY AUTOMATED COUNT: 14.3 % (ref 12.3–15.4)
ERYTHROCYTE [SEDIMENTATION RATE] IN BLOOD: 17 MM/HR (ref 0–30)
GFR SERPL CREATININE-BSD FRML MDRD: 84 ML/MIN/1.73
GLOBULIN UR ELPH-MCNC: 3.7 GM/DL
GLUCOSE SERPL-MCNC: 106 MG/DL (ref 65–99)
HCT VFR BLD AUTO: 39 % (ref 34–46.6)
HDLC SERPL-MCNC: 43 MG/DL (ref 40–60)
HGB BLD-MCNC: 12.2 G/DL (ref 12–15.9)
IMM GRANULOCYTES # BLD AUTO: 0.05 10*3/MM3 (ref 0–0.05)
IMM GRANULOCYTES NFR BLD AUTO: 0.5 % (ref 0–0.5)
LDLC SERPL CALC-MCNC: 55 MG/DL (ref 0–100)
LDLC/HDLC SERPL: 1.24 {RATIO}
LYMPHOCYTES # BLD AUTO: 1.61 10*3/MM3 (ref 0.7–3.1)
LYMPHOCYTES NFR BLD AUTO: 17.6 % (ref 19.6–45.3)
MCH RBC QN AUTO: 30.3 PG (ref 26.6–33)
MCHC RBC AUTO-ENTMCNC: 31.3 G/DL (ref 31.5–35.7)
MCV RBC AUTO: 96.8 FL (ref 79–97)
MONOCYTES # BLD AUTO: 0.59 10*3/MM3 (ref 0.1–0.9)
MONOCYTES NFR BLD AUTO: 6.5 % (ref 5–12)
NEUTROPHILS NFR BLD AUTO: 6.54 10*3/MM3 (ref 1.7–7)
NEUTROPHILS NFR BLD AUTO: 71.6 % (ref 42.7–76)
NRBC BLD AUTO-RTO: 0 /100 WBC (ref 0–0.2)
PLATELET # BLD AUTO: 300 10*3/MM3 (ref 140–450)
PMV BLD AUTO: 9.6 FL (ref 6–12)
POTASSIUM SERPL-SCNC: 3.9 MMOL/L (ref 3.5–5.2)
PROT SERPL-MCNC: 7.7 G/DL (ref 6–8.5)
RBC # BLD AUTO: 4.03 10*6/MM3 (ref 3.77–5.28)
SODIUM SERPL-SCNC: 140 MMOL/L (ref 136–145)
TRIGL SERPL-MCNC: 114 MG/DL (ref 0–150)
VLDLC SERPL-MCNC: 21 MG/DL (ref 5–40)
WBC # BLD AUTO: 9.14 10*3/MM3 (ref 3.4–10.8)

## 2021-07-07 PROCEDURE — 85652 RBC SED RATE AUTOMATED: CPT

## 2021-07-07 PROCEDURE — 85025 COMPLETE CBC W/AUTO DIFF WBC: CPT

## 2021-07-07 PROCEDURE — 36415 COLL VENOUS BLD VENIPUNCTURE: CPT

## 2021-07-07 PROCEDURE — 80053 COMPREHEN METABOLIC PANEL: CPT

## 2021-07-07 PROCEDURE — 80061 LIPID PANEL: CPT

## 2021-07-14 ENCOUNTER — HOSPITAL ENCOUNTER (OUTPATIENT)
Dept: MRI IMAGING | Facility: HOSPITAL | Age: 56
Discharge: HOME | End: 2021-07-14
Admitting: OPHTHALMOLOGY

## 2021-07-14 DIAGNOSIS — H46.8 OTHER OPTIC NEURITIS: ICD-10-CM

## 2021-07-14 PROCEDURE — 70551 MRI BRAIN STEM W/O DYE: CPT | Performed by: RADIOLOGY

## 2021-07-14 PROCEDURE — 70551 MRI BRAIN STEM W/O DYE: CPT

## 2021-08-04 NOTE — PLAN OF CARE
Patient alert and oriented x4. VSS. Right hip ATIYA dressing CDI and functional. NV intact. She has ambulated to BR throughout the night with assist x1 GB and RW. UOP adequate, no voiding difficulty. Nausea and vomiting; Zofran given prn. She has been able to take prn Lortab. Toradol given x1 and then was able to sleep a few hours. Continue to monitor.    Toileting

## 2022-02-04 ENCOUNTER — LAB (OUTPATIENT)
Dept: LAB | Facility: HOSPITAL | Age: 57
End: 2022-02-04

## 2022-02-04 ENCOUNTER — DOCUMENTATION (OUTPATIENT)
Dept: FAMILY MEDICINE CLINIC | Age: 57
End: 2022-02-04

## 2022-02-04 DIAGNOSIS — Z20.828 EXPOSURE TO SARS-ASSOCIATED CORONAVIRUS: Primary | ICD-10-CM

## 2022-02-04 DIAGNOSIS — Z20.828 EXPOSURE TO SARS-ASSOCIATED CORONAVIRUS: ICD-10-CM

## 2022-02-04 LAB
FLUAV SUBTYP SPEC NAA+PROBE: NOT DETECTED
FLUBV RNA ISLT QL NAA+PROBE: NOT DETECTED
SARS-COV-2 RNA PNL SPEC NAA+PROBE: DETECTED

## 2022-02-04 PROCEDURE — 87636 SARSCOV2 & INF A&B AMP PRB: CPT | Performed by: NURSE PRACTITIONER

## 2022-06-06 ENCOUNTER — HOSPITAL ENCOUNTER (OUTPATIENT)
Dept: MAMMOGRAPHY | Facility: HOSPITAL | Age: 57
Discharge: HOME | End: 2022-06-06
Admitting: NURSE PRACTITIONER

## 2022-06-06 DIAGNOSIS — Z12.31 VISIT FOR SCREENING MAMMOGRAM: ICD-10-CM

## 2022-06-06 PROCEDURE — 77067 SCR MAMMO BI INCL CAD: CPT | Performed by: RADIOLOGY

## 2022-06-06 PROCEDURE — 77063 BREAST TOMOSYNTHESIS BI: CPT | Performed by: RADIOLOGY

## 2022-06-06 PROCEDURE — 77067 SCR MAMMO BI INCL CAD: CPT

## 2022-06-06 PROCEDURE — 77063 BREAST TOMOSYNTHESIS BI: CPT

## 2023-05-22 PROBLEM — K57.92 DIVERTICULITIS: Status: ACTIVE | Noted: 2023-05-22

## 2023-05-22 NOTE — H&P (VIEW-ONLY)
Lorrie Espinosa  [id number]  1965      Reason for visit:  Newly diagnosed grade 1 endometrial adenocarcinoma     Consultation:  Patient is being seen at the request of [facility], Dr. Cruz    History of present illness:  The patient is a 57 y.o. year old female who presents today for treatment and evaluation of the above issues.    Patient has hx of postmenopausal bleeding, previously being seen by [facility]. Workup in  for PMB initially negative with ultrasound and EMB. Since EMB, patient continued on on prometrium 200mg qhs. Patient then had worsening of bleeding so underwent workup again with TVUS. Endometrial stripe was 27mm at this time. Patient then underwent hysteroscopy, D&C, polypectomy with myosure. Intra operative findings included uterus sounded to 10cm, numerous endometrial polyps. Pathology significant for grade 1 endometrial adenocarcinoma.     Patient feels well today. Continues to have light spotting. Denies abdominal pain, SOB, chest pain, changes in bowel/bladder habits.     For new patients, [facility] intake form from 23 was reviewed and confirmed.    OBGYN History:  She is a .  She does not use HRT. She does have a history of abnormal pap smears in the , s/p LEEP (based on her description of the procedure).     Oncologic History:  Oncology/Hematology History    No history exists.         Past Medical History:   Diagnosis Date   • Elevated cholesterol    • History of total left hip arthroplasty    • History of total right hip replacement    • Hypertension    • Tattoo    • Wears glasses        Past Surgical History:   Procedure Laterality Date   • ANKLE SURGERY     • COLONOSCOPY     • TOTAL HIP ARTHROPLASTY Left    • TOTAL HIP ARTHROPLASTY Right 2020    Procedure: TOTAL HIP ARTHROPLASTY ANTERIOR RIGHT;  Surgeon: Gabe Carranza MD;  Location: [facility];  Service: Orthopedics;  Laterality: Right;       MEDICATIONS: The current medication list  was reviewed with the patient and updated in the EMR this date per the Medical Assistant. Medication dosages and frequencies were confirmed to be accurate.      Allergies:  has No Known Allergies.    Social History:   Social History     Socioeconomic History   • Marital status:    Tobacco Use   • Smoking status: Never   • Smokeless tobacco: Never   Substance and Sexual Activity   • Alcohol use: Never   • Drug use: Never   • Sexual activity: Defer     Comment:      She is a  at [employer], lives in [place].       Family History:    Heart disease: maternal grandfather/grandmother  Cancer: Maternal grandmother  HTN: Father  DM2: Mother  No family hx of uterine, breast, GI, ovarian cancer.     Health Maintenance:    Health Maintenance   Topic Date Due   • COLORECTAL CANCER SCREENING  Never done   • COVID-19 Vaccine (1) Never done   • TDAP/TD VACCINES (1 - Tdap) Never done   • ZOSTER VACCINE (1 of 2) Never done   • HEPATITIS C SCREENING  Never done   • ANNUAL PHYSICAL  Never done   • PAP SMEAR  Never done   • LIPID PANEL  07/07/2022   • INFLUENZA VACCINE  08/01/2023   • MAMMOGRAM  06/06/2024   • Pneumococcal Vaccine 0-64  Aged Out   Has had colonoscopy and mammogram in the past, no abrnomal.     Review of Systems   Constitutional: Negative for activity change, appetite change, fatigue and fever.   Respiratory: Negative for shortness of breath and wheezing.    Cardiovascular: Negative for chest pain and leg swelling.   Gastrointestinal: Negative for abdominal pain, constipation, diarrhea, nausea and vomiting.   Genitourinary: Positive for vaginal bleeding (irregular light spotting). Negative for difficulty urinating.   Neurological: Negative for headaches.   Psychiatric/Behavioral: The patient is not nervous/anxious.        Physical Exam    Vitals:    05/24/23 0925   BP: 148/61   Pulse: 69   Resp: 20   Temp: 97.5 °F (36.4 °C)   TempSrc: Temporal   SpO2: 96%   Weight: (!) 145 kg (319 lb 9.6 oz)   Height:  "168.9 cm (66.5\")   PainSc: 0-No pain       Body mass index is 50.81 kg/m².  Wt Readings from Last 3 Encounters:   05/24/23 (!) 145 kg (319 lb 9.6 oz)   02/12/20 (!) 147 kg (324 lb 1.2 oz)   02/04/20 (!) 147 kg (324 lb 1.2 oz)       GENERAL: Alert, well-appearing female appearing her stated age who is in no apparent distress.   HEENT: Sclera anicteric. Head normocephalic, atraumatic. Mucus membranes moist.   NECK: Trachea midline, supple, without masses.  No thyromegaly.   BREASTS: Deferred  CARDIOVASCULAR: Normal rate. No peripheral edema.  RESPIRATORY: Clear to auscultation bilaterally, normal respiratory effort  BACK:  No CVA tenderness, no vertebral tenderness on palpation  GASTROINTESTINAL:  Abdomen is soft, non-tender, non-distended, no rebound or guarding, no masses, or hernias. No HSM.   SKIN:  Warm, dry, well-perfused.  All visible areas intact.  No rashes, lesions, ulcers.  PSYCHIATRIC: AO x3, with appropriate affect, normal thought processes.  NEUROLOGIC: No focal deficits.  Moves extremities well.  MUSCULOSKELETAL: Normal gait and station.   EXTREMITIES:   No cyanosis, clubbing, symmetric.  LYMPHATICS:  No cervical or inguinal adenopathy noted.     PELVIC exam:    External genitalia are free from lesion. On speculum examination, the cervix was free from lesion. No vaginal  Bleeding appreciated. On bimanual examination no mass was appreciated.  Uterus was normal in size and shape. There is no cervical motion or uterine tenderness. No cervical mass was palpated. Parametria were smooth. Rectovaginal exam was deferred.     ECOG PS 0      Diagnostic Data:          Assessment & Plan   This is a 57 y.o. woman with PMB, now with grade 1 EAC.     #Grade 1 EAC   #PMB   -PMB since 2020, workup previously negative and on Prometrium   -Worsening of bleeding in March, underwent repeat hysteroscopy, D&C. Pathology yielding grade 1 EAC   -Discussed pathology and recommendations to proceed with surgical management of " hysterectomy with possible need for adjuvant chemo/radiation pending final stage  -Also discussed risk factors for developing EAC, including obesity. Discussed importance of healthy lifestyle, weight loss. Patient has been exercising with her  with water aerobics, encouraged her to continue this after recovery from surgery. In the mean time, encouraged walking.   -Patient was consented for RA-TLH/BSO, SLND.  Risks and benefits of surgery were discussed.  This included, but was not limited to, infection and bleeding like when the skin is cut; damage to surrounding structures; and incisional complications.  Risk of DVT was addressed for major surgeries.  Standard of care efforts to minimize these risks were reviewed.  Typical hospital stay and recovery were discussed as well as post-procedure precautions.  Surgical implications of chronic illnesses on recovery and surgical outcome were reviewed.     Pain medication regimen for postoperative care was discussed.  Typical regimen and avoidance of narcotics was discussed.  Patient was educated that other factors, such as existing narcotic use, can impact postoperative pain management.      Risks and benefits of lymph node dissection were further discussed.  This included lymphocyst, hematoma, lymphedema, vascular injury, and nerve injury.    Patient verbalized understanding of the plan including the risks and benefits.  Appropriate perioperative testing including laboratory evaluation, EKG as clinically indicated, chest x-ray as clinically indicated.     -Will defer PAT testing as patient is overall healthy   -Encouraged her to discontinue her baby ASA until after surgery    #Obesity   -BMI 51  -Effects all aspects of care, increases surgical risk     #HTN   -Cont home Lostartan     #HLD   -Cont home statin    Pain assessment was performed today as a part of patient’s care.  For patients with pain related to surgery, gynecologic malignancy or cancer treatment, the  plan is as noted in the assessment/plan.  For patients with pain not related to these issues, they are to seek any further needed care from a more appropriate provider, such as PCP.      Orders Placed This Encounter   Procedures   • XR Chest 1 View     Standing Status:   Future     Standing Expiration Date:   5/24/2024     Order Specific Question:   Reason for Exam:     Answer:   endometrial cancer, pre-op   • Comprehensive Metabolic Panel     Standing Status:   Future     Standing Expiration Date:   5/24/2024     Order Specific Question:   Release to patient     Answer:   Routine Release   • Verify NPO Status     Standing Status:   Future   • Obtain Informed Consent     Standing Status:   Future     Order Specific Question:   Informed Consent Given For     Answer:   TOTAL LAPAROSCOPIC HYSTERECTOMY BILATERAL SALPINGOOPHORECTOMY, SENTINEL NODE DISSECTION WITH DAVINCI ROBOT, INJECTION OF ICG DYE   • Provide Instructions to Patient on NPO Status     Standing Status:   Future   • Chlorhexidine Skin Prep     Chlorhexidine Skin Prep and Instructions For All Patients Having A Procedure Requiring an Outward Incision if Not Allergic. If Allergic, Give Antibacterial Skin Wipes and Instructions. Do Not Use For Facial Cases or on Any Mucus Membranes.     Standing Status:   Future   • Instruct Patient on Coughing, Deep Breathing & Incentive Spirometry     Standing Status:   Future   • ECG 12 Lead     Standing Status:   Future     Standing Expiration Date:   5/24/2024     Order Specific Question:   Reason for Exam:     Answer:   pre-op     Order Specific Question:   Release to patient     Answer:   Routine Release   • Type & Screen     Standing Status:   Future     Standing Expiration Date:   5/24/2024     Order Specific Question:   Release to patient     Answer:   Routine Release   • CBC & Differential     Standing Status:   Future     Standing Expiration Date:   5/24/2024     Order Specific Question:   Manual Differential      Answer:   No     Order Specific Question:   Release to patient     Answer:   Routine Release       FOLLOW UP: For surgery     I spent 60 minutes caring for Lorrie on this date of service. This time includes time spent by me in the following activities: preparing for the visit, reviewing tests, obtaining and/or reviewing a separately obtained history, performing a medically appropriate examination and/or evaluation, counseling and educating the patient/family/caregiver, ordering medications, tests, or procedures, documenting information in the medical record and care coordination  I spent  minutes on the separately reported service of . This time is not included in the time used to support the E/M service also reported today.    Electronically Signed by: Laura New MD,  Gynecology Oncology Resident  Date: 5/24/2023

## 2023-05-22 NOTE — PROGRESS NOTES
Lorrie Espinosa  [id number]  1965      Reason for visit:  Newly diagnosed grade 1 endometrial adenocarcinoma     Consultation:  Patient is being seen at the request of [facility], Dr. Cruz    History of present illness:  The patient is a 57 y.o. year old female who presents today for treatment and evaluation of the above issues.    Patient has hx of postmenopausal bleeding, previously being seen by [facility]. Workup in  for PMB initially negative with ultrasound and EMB. Since EMB, patient continued on on prometrium 200mg qhs. Patient then had worsening of bleeding so underwent workup again with TVUS. Endometrial stripe was 27mm at this time. Patient then underwent hysteroscopy, D&C, polypectomy with myosure. Intra operative findings included uterus sounded to 10cm, numerous endometrial polyps. Pathology significant for grade 1 endometrial adenocarcinoma.     Patient feels well today. Continues to have light spotting. Denies abdominal pain, SOB, chest pain, changes in bowel/bladder habits.     For new patients, [facility] intake form from 23 was reviewed and confirmed.    OBGYN History:  She is a .  She does not use HRT. She does have a history of abnormal pap smears in the , s/p LEEP (based on her description of the procedure).     Oncologic History:  Oncology/Hematology History    No history exists.         Past Medical History:   Diagnosis Date   • Elevated cholesterol    • History of total left hip arthroplasty    • History of total right hip replacement    • Hypertension    • Tattoo    • Wears glasses        Past Surgical History:   Procedure Laterality Date   • ANKLE SURGERY     • COLONOSCOPY     • TOTAL HIP ARTHROPLASTY Left    • TOTAL HIP ARTHROPLASTY Right 2020    Procedure: TOTAL HIP ARTHROPLASTY ANTERIOR RIGHT;  Surgeon: Gabe Carranza MD;  Location: [facility];  Service: Orthopedics;  Laterality: Right;       MEDICATIONS: The current medication list  was reviewed with the patient and updated in the EMR this date per the Medical Assistant. Medication dosages and frequencies were confirmed to be accurate.      Allergies:  has No Known Allergies.    Social History:   Social History     Socioeconomic History   • Marital status:    Tobacco Use   • Smoking status: Never   • Smokeless tobacco: Never   Substance and Sexual Activity   • Alcohol use: Never   • Drug use: Never   • Sexual activity: Defer     Comment:      She is a  at [employer], lives in [place].       Family History:    Heart disease: maternal grandfather/grandmother  Cancer: Maternal grandmother  HTN: Father  DM2: Mother  No family hx of uterine, breast, GI, ovarian cancer.     Health Maintenance:    Health Maintenance   Topic Date Due   • COLORECTAL CANCER SCREENING  Never done   • COVID-19 Vaccine (1) Never done   • TDAP/TD VACCINES (1 - Tdap) Never done   • ZOSTER VACCINE (1 of 2) Never done   • HEPATITIS C SCREENING  Never done   • ANNUAL PHYSICAL  Never done   • PAP SMEAR  Never done   • LIPID PANEL  07/07/2022   • INFLUENZA VACCINE  08/01/2023   • MAMMOGRAM  06/06/2024   • Pneumococcal Vaccine 0-64  Aged Out   Has had colonoscopy and mammogram in the past, no abrnomal.     Review of Systems   Constitutional: Negative for activity change, appetite change, fatigue and fever.   Respiratory: Negative for shortness of breath and wheezing.    Cardiovascular: Negative for chest pain and leg swelling.   Gastrointestinal: Negative for abdominal pain, constipation, diarrhea, nausea and vomiting.   Genitourinary: Positive for vaginal bleeding (irregular light spotting). Negative for difficulty urinating.   Neurological: Negative for headaches.   Psychiatric/Behavioral: The patient is not nervous/anxious.        Physical Exam    Vitals:    05/24/23 0925   BP: 148/61   Pulse: 69   Resp: 20   Temp: 97.5 °F (36.4 °C)   TempSrc: Temporal   SpO2: 96%   Weight: (!) 145 kg (319 lb 9.6 oz)   Height:  "168.9 cm (66.5\")   PainSc: 0-No pain       Body mass index is 50.81 kg/m².  Wt Readings from Last 3 Encounters:   05/24/23 (!) 145 kg (319 lb 9.6 oz)   02/12/20 (!) 147 kg (324 lb 1.2 oz)   02/04/20 (!) 147 kg (324 lb 1.2 oz)       GENERAL: Alert, well-appearing female appearing her stated age who is in no apparent distress.   HEENT: Sclera anicteric. Head normocephalic, atraumatic. Mucus membranes moist.   NECK: Trachea midline, supple, without masses.  No thyromegaly.   BREASTS: Deferred  CARDIOVASCULAR: Normal rate. No peripheral edema.  RESPIRATORY: Clear to auscultation bilaterally, normal respiratory effort  BACK:  No CVA tenderness, no vertebral tenderness on palpation  GASTROINTESTINAL:  Abdomen is soft, non-tender, non-distended, no rebound or guarding, no masses, or hernias. No HSM.   SKIN:  Warm, dry, well-perfused.  All visible areas intact.  No rashes, lesions, ulcers.  PSYCHIATRIC: AO x3, with appropriate affect, normal thought processes.  NEUROLOGIC: No focal deficits.  Moves extremities well.  MUSCULOSKELETAL: Normal gait and station.   EXTREMITIES:   No cyanosis, clubbing, symmetric.  LYMPHATICS:  No cervical or inguinal adenopathy noted.     PELVIC exam:    External genitalia are free from lesion. On speculum examination, the cervix was free from lesion. No vaginal  Bleeding appreciated. On bimanual examination no mass was appreciated.  Uterus was normal in size and shape. There is no cervical motion or uterine tenderness. No cervical mass was palpated. Parametria were smooth. Rectovaginal exam was deferred.     ECOG PS 0      Diagnostic Data:          Assessment & Plan   This is a 57 y.o. woman with PMB, now with grade 1 EAC.     #Grade 1 EAC   #PMB   -PMB since 2020, workup previously negative and on Prometrium   -Worsening of bleeding in March, underwent repeat hysteroscopy, D&C. Pathology yielding grade 1 EAC   -Discussed pathology and recommendations to proceed with surgical management of " hysterectomy with possible need for adjuvant chemo/radiation pending final stage  -Also discussed risk factors for developing EAC, including obesity. Discussed importance of healthy lifestyle, weight loss. Patient has been exercising with her  with water aerobics, encouraged her to continue this after recovery from surgery. In the mean time, encouraged walking.   -Patient was consented for RA-TLH/BSO, SLND.  Risks and benefits of surgery were discussed.  This included, but was not limited to, infection and bleeding like when the skin is cut; damage to surrounding structures; and incisional complications.  Risk of DVT was addressed for major surgeries.  Standard of care efforts to minimize these risks were reviewed.  Typical hospital stay and recovery were discussed as well as post-procedure precautions.  Surgical implications of chronic illnesses on recovery and surgical outcome were reviewed.     Pain medication regimen for postoperative care was discussed.  Typical regimen and avoidance of narcotics was discussed.  Patient was educated that other factors, such as existing narcotic use, can impact postoperative pain management.      Risks and benefits of lymph node dissection were further discussed.  This included lymphocyst, hematoma, lymphedema, vascular injury, and nerve injury.    Patient verbalized understanding of the plan including the risks and benefits.  Appropriate perioperative testing including laboratory evaluation, EKG as clinically indicated, chest x-ray as clinically indicated.     -Will defer PAT testing as patient is overall healthy   -Encouraged her to discontinue her baby ASA until after surgery    #Obesity   -BMI 51  -Effects all aspects of care, increases surgical risk     #HTN   -Cont home Lostartan     #HLD   -Cont home statin    Pain assessment was performed today as a part of patient’s care.  For patients with pain related to surgery, gynecologic malignancy or cancer treatment, the  plan is as noted in the assessment/plan.  For patients with pain not related to these issues, they are to seek any further needed care from a more appropriate provider, such as PCP.      Orders Placed This Encounter   Procedures   • XR Chest 1 View     Standing Status:   Future     Standing Expiration Date:   5/24/2024     Order Specific Question:   Reason for Exam:     Answer:   endometrial cancer, pre-op   • Comprehensive Metabolic Panel     Standing Status:   Future     Standing Expiration Date:   5/24/2024     Order Specific Question:   Release to patient     Answer:   Routine Release   • Verify NPO Status     Standing Status:   Future   • Obtain Informed Consent     Standing Status:   Future     Order Specific Question:   Informed Consent Given For     Answer:   TOTAL LAPAROSCOPIC HYSTERECTOMY BILATERAL SALPINGOOPHORECTOMY, SENTINEL NODE DISSECTION WITH DAVINCI ROBOT, INJECTION OF ICG DYE   • Provide Instructions to Patient on NPO Status     Standing Status:   Future   • Chlorhexidine Skin Prep     Chlorhexidine Skin Prep and Instructions For All Patients Having A Procedure Requiring an Outward Incision if Not Allergic. If Allergic, Give Antibacterial Skin Wipes and Instructions. Do Not Use For Facial Cases or on Any Mucus Membranes.     Standing Status:   Future   • Instruct Patient on Coughing, Deep Breathing & Incentive Spirometry     Standing Status:   Future   • ECG 12 Lead     Standing Status:   Future     Standing Expiration Date:   5/24/2024     Order Specific Question:   Reason for Exam:     Answer:   pre-op     Order Specific Question:   Release to patient     Answer:   Routine Release   • Type & Screen     Standing Status:   Future     Standing Expiration Date:   5/24/2024     Order Specific Question:   Release to patient     Answer:   Routine Release   • CBC & Differential     Standing Status:   Future     Standing Expiration Date:   5/24/2024     Order Specific Question:   Manual Differential      Answer:   No     Order Specific Question:   Release to patient     Answer:   Routine Release       FOLLOW UP: For surgery     I spent 60 minutes caring for Lorrie on this date of service. This time includes time spent by me in the following activities: preparing for the visit, reviewing tests, obtaining and/or reviewing a separately obtained history, performing a medically appropriate examination and/or evaluation, counseling and educating the patient/family/caregiver, ordering medications, tests, or procedures, documenting information in the medical record and care coordination  I spent  minutes on the separately reported service of . This time is not included in the time used to support the E/M service also reported today.    Electronically Signed by: Laura New MD,  Gynecology Oncology Resident  Date: 5/24/2023

## 2023-05-23 PROBLEM — C54.1 ENDOMETRIAL CANCER: Status: ACTIVE | Noted: 2023-05-23

## 2023-05-24 ENCOUNTER — RESEARCH ENCOUNTER (OUTPATIENT)
Dept: OTHER | Facility: OTHER | Age: 58
End: 2023-05-24
Payer: COMMERCIAL

## 2023-05-24 ENCOUNTER — LAB (OUTPATIENT)
Dept: LAB | Facility: HOSPITAL | Age: 58
End: 2023-05-24
Payer: COMMERCIAL

## 2023-05-24 ENCOUNTER — OFFICE VISIT (OUTPATIENT)
Dept: GYNECOLOGIC ONCOLOGY | Facility: CLINIC | Age: 58
End: 2023-05-24
Payer: COMMERCIAL

## 2023-05-24 VITALS
HEIGHT: 67 IN | WEIGHT: 293 LBS | DIASTOLIC BLOOD PRESSURE: 61 MMHG | TEMPERATURE: 97.5 F | OXYGEN SATURATION: 96 % | HEART RATE: 69 BPM | RESPIRATION RATE: 20 BRPM | SYSTOLIC BLOOD PRESSURE: 148 MMHG | BODY MASS INDEX: 45.99 KG/M2

## 2023-05-24 DIAGNOSIS — Z01.810 PREOP CARDIOVASCULAR EXAM: ICD-10-CM

## 2023-05-24 DIAGNOSIS — C54.1 ENDOMETRIAL CANCER: ICD-10-CM

## 2023-05-24 DIAGNOSIS — C54.1 ENDOMETRIAL CANCER: Primary | ICD-10-CM

## 2023-05-24 LAB
ALBUMIN SERPL-MCNC: 4.3 G/DL (ref 3.5–5.2)
ALBUMIN/GLOB SERPL: 1.1 G/DL
ALP SERPL-CCNC: 84 U/L (ref 39–117)
ALT SERPL W P-5'-P-CCNC: 14 U/L (ref 1–33)
ANION GAP SERPL CALCULATED.3IONS-SCNC: 11 MMOL/L (ref 5–15)
AST SERPL-CCNC: 20 U/L (ref 1–32)
BASOPHILS # BLD AUTO: 0.01 10*3/MM3 (ref 0–0.2)
BASOPHILS NFR BLD AUTO: 0.1 % (ref 0–1.5)
BILIRUB SERPL-MCNC: 0.5 MG/DL (ref 0–1.2)
BUN SERPL-MCNC: 13 MG/DL (ref 6–20)
BUN/CREAT SERPL: 18.6 (ref 7–25)
CALCIUM SPEC-SCNC: 9.7 MG/DL (ref 8.6–10.5)
CHLORIDE SERPL-SCNC: 101 MMOL/L (ref 98–107)
CO2 SERPL-SCNC: 27 MMOL/L (ref 22–29)
CREAT SERPL-MCNC: 0.7 MG/DL (ref 0.57–1)
DEPRECATED RDW RBC AUTO: 46.4 FL (ref 37–54)
EGFRCR SERPLBLD CKD-EPI 2021: 100.4 ML/MIN/1.73
EOSINOPHIL # BLD AUTO: 0.22 10*3/MM3 (ref 0–0.4)
EOSINOPHIL NFR BLD AUTO: 2.4 % (ref 0.3–6.2)
ERYTHROCYTE [DISTWIDTH] IN BLOOD BY AUTOMATED COUNT: 13 % (ref 12.3–15.4)
GLOBULIN UR ELPH-MCNC: 3.9 GM/DL
GLUCOSE SERPL-MCNC: 103 MG/DL (ref 65–99)
HCT VFR BLD AUTO: 42.1 % (ref 34–46.6)
HGB BLD-MCNC: 13.7 G/DL (ref 12–15.9)
IMM GRANULOCYTES # BLD AUTO: 0.01 10*3/MM3 (ref 0–0.05)
IMM GRANULOCYTES NFR BLD AUTO: 0.1 % (ref 0–0.5)
LYMPHOCYTES # BLD AUTO: 1.32 10*3/MM3 (ref 0.7–3.1)
LYMPHOCYTES NFR BLD AUTO: 14.7 % (ref 19.6–45.3)
MCH RBC QN AUTO: 31.1 PG (ref 26.6–33)
MCHC RBC AUTO-ENTMCNC: 32.5 G/DL (ref 31.5–35.7)
MCV RBC AUTO: 95.7 FL (ref 79–97)
MONOCYTES # BLD AUTO: 0.38 10*3/MM3 (ref 0.1–0.9)
MONOCYTES NFR BLD AUTO: 4.2 % (ref 5–12)
NEUTROPHILS NFR BLD AUTO: 7.04 10*3/MM3 (ref 1.7–7)
NEUTROPHILS NFR BLD AUTO: 78.5 % (ref 42.7–76)
PLATELET # BLD AUTO: 313 10*3/MM3 (ref 140–450)
PMV BLD AUTO: 9.6 FL (ref 6–12)
POTASSIUM SERPL-SCNC: 3.8 MMOL/L (ref 3.5–5.2)
PROT SERPL-MCNC: 8.2 G/DL (ref 6–8.5)
RBC # BLD AUTO: 4.4 10*6/MM3 (ref 3.77–5.28)
SODIUM SERPL-SCNC: 139 MMOL/L (ref 136–145)
WBC NRBC COR # BLD: 8.98 10*3/MM3 (ref 3.4–10.8)

## 2023-05-24 PROCEDURE — 85025 COMPLETE CBC W/AUTO DIFF WBC: CPT

## 2023-05-24 PROCEDURE — 36415 COLL VENOUS BLD VENIPUNCTURE: CPT

## 2023-05-24 PROCEDURE — 80053 COMPREHEN METABOLIC PANEL: CPT

## 2023-05-24 RX ORDER — HEPARIN SODIUM 5000 [USP'U]/ML
5000 INJECTION, SOLUTION INTRAVENOUS; SUBCUTANEOUS ONCE
OUTPATIENT
Start: 2023-05-24 | End: 2023-05-24

## 2023-05-24 RX ORDER — ACETAMINOPHEN 500 MG
1000 TABLET ORAL ONCE
OUTPATIENT
Start: 2023-05-24 | End: 2023-05-24

## 2023-05-24 RX ORDER — CELECOXIB 100 MG/1
200 CAPSULE ORAL ONCE
OUTPATIENT
Start: 2023-05-24 | End: 2023-05-24

## 2023-05-24 RX ORDER — SCOLOPAMINE TRANSDERMAL SYSTEM 1 MG/1
1 PATCH, EXTENDED RELEASE TRANSDERMAL CONTINUOUS
OUTPATIENT
Start: 2023-05-24 | End: 2023-05-27

## 2023-05-24 RX ORDER — OXYCODONE HYDROCHLORIDE 5 MG/1
5 TABLET ORAL ONCE
OUTPATIENT
Start: 2023-05-24 | End: 2023-05-24

## 2023-05-24 RX ORDER — SODIUM CHLORIDE 0.9 % (FLUSH) 0.9 %
10 SYRINGE (ML) INJECTION EVERY 12 HOURS SCHEDULED
OUTPATIENT
Start: 2023-05-24

## 2023-05-24 RX ORDER — SODIUM CHLORIDE 0.9 % (FLUSH) 0.9 %
10 SYRINGE (ML) INJECTION AS NEEDED
OUTPATIENT
Start: 2023-05-24

## 2023-05-24 RX ORDER — SODIUM CHLORIDE 9 MG/ML
40 INJECTION, SOLUTION INTRAVENOUS AS NEEDED
OUTPATIENT
Start: 2023-05-24

## 2023-05-24 RX ORDER — PREGABALIN 75 MG/1
150 CAPSULE ORAL ONCE
OUTPATIENT
Start: 2023-05-24 | End: 2023-05-24

## 2023-05-24 NOTE — PATIENT INSTRUCTIONS
"Surgery Instructions            Lorrie Espinosa  [id number]  1965      SURGEON:  Keisha Croft MD    Surgery Coordinator:  MYAH GARCIA    If you have any questions before or after surgery please call.  [phone]        Appointment           1. Your surgery has been scheduled on 06/01/2023.  You will need to go to Main Registration to check in at 9:30 AM . They will then send up to the [address] [place] [address] second floor surgery area.    The Day(s) Before Surgery      Nothing by mouth after midnight on 05/31/2023.    Plan to have someone take you home from the hospital.    Do not use any products that contain nicotine or tobacco, such as cigarettes and e-cigarettes. These can delay healing after surgery. If you need help quitting, ask your health care provider.     Do not take vitamins or aspirin one week before surgery ( if applicable). Do not take Ibuprofen or NSAIDs 5 days prior to Surgery. Do not take ACE or ARB medications for blood pressure the morning of surgery. If you are taking insulin, take 1/2 dose insulin the night prior to surgery.   Bring them with you to the hospital (Diabetic patient should bring insulin if instructed by the managing physician). If you are taking a blood thinner ( Eliquis, Coumadin, Xarelto, Lovenox, Asprin, Heparin, etc.) please have the provider that manages this instruct you on when to stop taking prior to surgery.     Continue antidepressants, Beta Blockers \"olol\", anti-seizure medication, GERD medication (heartburn), Opioids and Parkinson's medication.       If you are feeling sick, have a fever or cough and have seen your PCP let our office know 48 hours prior to surgery. It may be subject to rescheduling.         The Day of Surgery:    Do not chew gum or tobacco, smoke, or eat mints or hard candy. Shower and wash your hair. You may brush your teeth but  do not swallow water. Use any wipes that Pre-admission testing has given you.     Please arrive for surgery as instructed by " the pre-op nurse, often one to two hours before your surgery.     Remove all jewelry, including rings and piercings. Do not bring valuables to the hospital.     Wear loose-fitting clothing.     Avoid wearing eye makeup or contact lenses     Please remember to bring:  ? Photo ID and medical insurance card  ? Advanced directives, living will or power of  (if applicable)  ? Current list of all medications, including over-the- counter and herbal supplements  ? List of allergies  ? CPAP device if you have sleep apnea  ? Any assistive devices or equipment needed after surgery  ? Books/magazines to pass the time       Once you are called to go to your pre-op room, no one will be allowed in the pre op room.     Please note no one under age 12 is permitted to stay in the waiting area without supervision.    We make every effort to begin surgery at your scheduled start time but delays do occur. We will keep you and your family  updated about any delays.    While You are In the Pre-Op Room:   The nurse will review your health history and will place an IV (into the vein) in your hand or arm for fluids and  medicines.   An anesthesia provider will talk with you about anesthesia and pain control during and after surgery.   A member of the surgical team can answer your questions.             What can I expect after the procedure?    After Surgery and/or Discharge:    After surgery you will be moved to the recovery area. Recovery and discharge times will vary depending on the procedure.    The recovery room nurse will provide your family/visitors with updates. Visitors are not permitted in the immediate postoperative recovery area, but your visitors will be notified when they can come to see you after surgery.    If you will not be admitted to the hospital, your nurse will assess your readiness to go home. This includes your:     Ability to walk, use crutches, etc.   Ability to urinate adequate amounts   Nausea and pain  control    Before discharge, you will receive written instructions about how to care for yourself at home along with any prescriptions  for medications.     For your safety, you will need a responsible adult age 18 or older to accompany you home.     Please have a ride arranged and available when you are ready to leave. You cannot leave alone and it is recommended someone stay with you for the first 24 hours after anesthesia.       After the procedure, it is common to have:    Pain.  Soreness and numbness in your incision areas.  Constipation.  Temporary change in bladder function.  Feelings of sadness or other emotions.  Small amounts of clear drainage from incisions  If you are discharged with an abdominal binder, this is to be used as needed for incisional comfort. You may choose to discontinue use at any time.           Follow these instructions at home:  Medicines    Please take any medications that have been prescribed after your surgery, you may take over the counter Tylenol and Ibuprofen, unless told otherwise by your provider.   Please take your stool softener as prescribed. If you do not have a bowel movement within 24 hours following surgery try a laxative (milk of magnesia) ( Senna-Sara)  or you may take Anabell-Lax.    Activity    Return to your normal activities as told by your health care provider.   You may be told to take short walks every day and go farther each time.  Do not lift anything that is heavier than 20 lbs.      General instructions    Do not put anything in your vagina for 6 weeks after your surgery or as told by your health care provider. This includes tampons and douches.  Do not have sex until your health care provider says you can.  Do not take baths, swim, or use a hot tub until your health care provider approves.  Drink enough fluid to keep your urine clear or pale yellow.  Do not drive for 24 hours if you were given a sedative.  Do not drive while taking Narcotic Pain medication (  oxycodone, hydrocodone, etc.).   Keep all follow-up visits as told by your health care provider. This is important. You will have a post op appointment typically 3 weeks after surgery.  Make sure you are urinating on a scheduled basis, for example every 2 hours. This will help retrain your bladder after surgery and prevent urinary tract infections.     Contact a health care provider if:    Your pain medicine is not helping.  You have a fever over 101.0  You have redness, swelling, or pain at your incision site.  You continue to have difficulty urinating.  You have not had a bowel movement 2-3 days after surgery, experience nausea and vomiting, are unable to pass gas.   Large volumes of drainage or blood from incisions, requiring multiple guaze changes.     Get help right away if:    You have severe abdominal or back pain that is not controlled with medication.      If you experience a medical emergency call 911 or have someone drive you to your nearest emergency department.         Parking Information:    Free parking is available for patients and guests. There is  parking at the [facility] Building in front of the hospital. Parking is also available in the [facility] and [facility].         Financial Assistance:    If you have any questions or need assistance, contact your [facility] financial counseling office from 8:30 a.m.-4:30  p.m. Monday through Friday. Closed weekends.     [place]: [phone] or, or visit at [address] [facility], [address], near the entrance.        Patient Payments and Correspondence    Customer service representatives are available to assist you from 8:00 a.m. to 6:00 p.m. Eastern Standard Time by calling [phone] Monday through Friday. You can also contact us through [employer].    [facility]  [address]  [place] [address] [phone]

## 2023-05-24 NOTE — RESEARCH
Patient Name:  Lorrie Espinosa  YOB: 1965  Patient Age:  57 y.o.  Patient's Sex:  female    Date of Service:  05/24/2023    Provider: ROBERT Conte                     RESEARCH NOTE                Protocol:  Myra     Information (all 8 elements of the ICF) concerning the protocol including, description of procedures to be followed, participant responsibilities, confidentiality, foreseeable risks, compensation, availability, benefits and alternatives to participation, was reviewed with the research participant in an understandable language. The participant was encouraged to ask questions throughout the informed consent process. Any questions and/or concerns were answered to the participant’s satisfaction.     After giving the participant time to consider, the participant chose to participate in the study. The informed consent document signature process was completed.     Participant authorization for the use and disclosure of protected health information for research purposes (HIPAA Privacy Rule) was acknowledged and signed on the date noted on the consent form.     Contact information for the research department and physician/investigator was provided. A copy of the signed informed consent form was given to the study participant.     No study specific assessments were completed prior to obtaining informed consent.     By signing this document, I attest that I participated in the informed consent discussion with the participant.       Participant specimen was collected today on 5/24/2023.

## 2023-05-26 ENCOUNTER — PATIENT ROUNDING (BHMG ONLY) (OUTPATIENT)
Dept: GYNECOLOGIC ONCOLOGY | Facility: CLINIC | Age: 58
End: 2023-05-26
Payer: COMMERCIAL

## 2023-05-26 NOTE — PROGRESS NOTES
A My-Chart message has been sent to the patient for PATIENT ROUNDING with [facility].

## 2023-05-31 ENCOUNTER — HOSPITAL ENCOUNTER (OUTPATIENT)
Dept: GENERAL RADIOLOGY | Facility: HOSPITAL | Age: 58
Discharge: HOME | End: 2023-05-31

## 2023-05-31 ENCOUNTER — TELEPHONE (OUTPATIENT)
Dept: GYNECOLOGIC ONCOLOGY | Facility: CLINIC | Age: 58
End: 2023-05-31

## 2023-05-31 ENCOUNTER — HOSPITAL ENCOUNTER (OUTPATIENT)
Dept: RESPIRATORY THERAPY | Facility: HOSPITAL | Age: 58
Discharge: HOME | End: 2023-05-31

## 2023-05-31 DIAGNOSIS — C54.1 ENDOMETRIAL CANCER: ICD-10-CM

## 2023-05-31 DIAGNOSIS — Z01.810 PREOP CARDIOVASCULAR EXAM: ICD-10-CM

## 2023-05-31 LAB
QT INTERVAL: 414 MS
QTC INTERVAL: 409 MS

## 2023-05-31 PROCEDURE — 71046 X-RAY EXAM CHEST 2 VIEWS: CPT

## 2023-05-31 PROCEDURE — 93005 ELECTROCARDIOGRAM TRACING: CPT | Performed by: OBSTETRICS & GYNECOLOGY

## 2023-05-31 PROCEDURE — 71046 X-RAY EXAM CHEST 2 VIEWS: CPT | Performed by: RADIOLOGY

## 2023-05-31 NOTE — TELEPHONE ENCOUNTER
Called and confirmed surgery for 6/1/23 with an arrival time of 930. Pt v/u.  states she wasn't told when she should get blood work and chest x ray completed so her plan was to do when she got here in the morning. Advised her to go today and have them completed at [facility] so all results are back in the morning. Pt v/u

## 2023-06-01 ENCOUNTER — ANESTHESIA (OUTPATIENT)
Dept: PERIOP | Facility: HOSPITAL | Age: 58
End: 2023-06-01
Payer: COMMERCIAL

## 2023-06-01 ENCOUNTER — ANESTHESIA EVENT (OUTPATIENT)
Dept: PERIOP | Facility: HOSPITAL | Age: 58
End: 2023-06-01
Payer: COMMERCIAL

## 2023-06-01 ENCOUNTER — HOSPITAL ENCOUNTER (OUTPATIENT)
Facility: HOSPITAL | Age: 58
Discharge: HOME | End: 2023-06-01
Attending: OBSTETRICS & GYNECOLOGY | Admitting: OBSTETRICS & GYNECOLOGY
Payer: COMMERCIAL

## 2023-06-01 VITALS
DIASTOLIC BLOOD PRESSURE: 72 MMHG | TEMPERATURE: 98 F | HEIGHT: 67 IN | SYSTOLIC BLOOD PRESSURE: 134 MMHG | OXYGEN SATURATION: 95 % | WEIGHT: 293 LBS | HEART RATE: 66 BPM | RESPIRATION RATE: 18 BRPM | BODY MASS INDEX: 45.99 KG/M2

## 2023-06-01 DIAGNOSIS — C54.1 ENDOMETRIAL CANCER: ICD-10-CM

## 2023-06-01 LAB
ABO GROUP BLD: NORMAL
BLD GP AB SCN SERPL QL: NEGATIVE
POTASSIUM SERPL-SCNC: 4.3 MMOL/L (ref 3.5–5.2)
RH BLD: POSITIVE
T&S EXPIRATION DATE: NORMAL

## 2023-06-01 PROCEDURE — 84132 ASSAY OF SERUM POTASSIUM: CPT | Performed by: ANESTHESIOLOGY

## 2023-06-01 PROCEDURE — G0378 HOSPITAL OBSERVATION PER HR: HCPCS

## 2023-06-01 PROCEDURE — 25010000002 HYDROMORPHONE 1 MG/ML SOLUTION

## 2023-06-01 PROCEDURE — 38571 LAPAROSCOPY LYMPHADENECTOMY: CPT | Performed by: OBSTETRICS & GYNECOLOGY

## 2023-06-01 PROCEDURE — 25010000002 ONDANSETRON PER 1 MG: Performed by: ANESTHESIOLOGY

## 2023-06-01 PROCEDURE — 25010000002 CEFAZOLIN PER 500 MG: Performed by: OBSTETRICS & GYNECOLOGY

## 2023-06-01 PROCEDURE — 88307 TISSUE EXAM BY PATHOLOGIST: CPT | Performed by: OBSTETRICS & GYNECOLOGY

## 2023-06-01 PROCEDURE — 86901 BLOOD TYPING SEROLOGIC RH(D): CPT | Performed by: OBSTETRICS & GYNECOLOGY

## 2023-06-01 PROCEDURE — 25010000002 INDOCYANINE GREEN 25 MG RECONSTITUTED SOLUTION: Performed by: OBSTETRICS & GYNECOLOGY

## 2023-06-01 PROCEDURE — 58571 TLH W/T/O 250 G OR LESS: CPT | Performed by: OBSTETRICS & GYNECOLOGY

## 2023-06-01 PROCEDURE — 38900 IO MAP OF SENT LYMPH NODE: CPT | Performed by: OBSTETRICS & GYNECOLOGY

## 2023-06-01 PROCEDURE — 25010000002 FENTANYL CITRATE (PF) 50 MCG/ML SOLUTION

## 2023-06-01 PROCEDURE — 86900 BLOOD TYPING SEROLOGIC ABO: CPT | Performed by: OBSTETRICS & GYNECOLOGY

## 2023-06-01 PROCEDURE — 25010000002 PROPOFOL 10 MG/ML EMULSION: Performed by: NURSE ANESTHETIST, CERTIFIED REGISTERED

## 2023-06-01 PROCEDURE — 25010000002 SUGAMMADEX 500 MG/5ML SOLUTION: Performed by: NURSE ANESTHETIST, CERTIFIED REGISTERED

## 2023-06-01 PROCEDURE — 86850 RBC ANTIBODY SCREEN: CPT | Performed by: OBSTETRICS & GYNECOLOGY

## 2023-06-01 PROCEDURE — 25010000002 ONDANSETRON PER 1 MG: Performed by: NURSE ANESTHETIST, CERTIFIED REGISTERED

## 2023-06-01 PROCEDURE — 25010000002 DEXAMETHASONE PER 1 MG: Performed by: NURSE ANESTHETIST, CERTIFIED REGISTERED

## 2023-06-01 PROCEDURE — 25010000002 HEPARIN (PORCINE) PER 1000 UNITS: Performed by: OBSTETRICS & GYNECOLOGY

## 2023-06-01 RX ORDER — OXYCODONE HYDROCHLORIDE 5 MG/1
5 TABLET ORAL EVERY 6 HOURS PRN
Qty: 5 TABLET | Refills: 0 | Status: SHIPPED | OUTPATIENT
Start: 2023-06-01

## 2023-06-01 RX ORDER — ACETAMINOPHEN 500 MG
1000 TABLET ORAL ONCE
Status: COMPLETED | OUTPATIENT
Start: 2023-06-01 | End: 2023-06-01

## 2023-06-01 RX ORDER — CELECOXIB 200 MG/1
200 CAPSULE ORAL ONCE
Status: COMPLETED | OUTPATIENT
Start: 2023-06-01 | End: 2023-06-01

## 2023-06-01 RX ORDER — PREGABALIN 150 MG/1
150 CAPSULE ORAL ONCE
Status: COMPLETED | OUTPATIENT
Start: 2023-06-01 | End: 2023-06-01

## 2023-06-01 RX ORDER — PROPOFOL 10 MG/ML
VIAL (ML) INTRAVENOUS AS NEEDED
Status: DISCONTINUED | OUTPATIENT
Start: 2023-06-01 | End: 2023-06-01 | Stop reason: SURG

## 2023-06-01 RX ORDER — ONDANSETRON 2 MG/ML
4 INJECTION INTRAMUSCULAR; INTRAVENOUS ONCE AS NEEDED
Status: DISCONTINUED | OUTPATIENT
Start: 2023-06-01 | End: 2023-06-01 | Stop reason: HOSPADM

## 2023-06-01 RX ORDER — DEXAMETHASONE SODIUM PHOSPHATE 4 MG/ML
INJECTION, SOLUTION INTRA-ARTICULAR; INTRALESIONAL; INTRAMUSCULAR; INTRAVENOUS; SOFT TISSUE AS NEEDED
Status: DISCONTINUED | OUTPATIENT
Start: 2023-06-01 | End: 2023-06-01 | Stop reason: SURG

## 2023-06-01 RX ORDER — LIDOCAINE HYDROCHLORIDE 10 MG/ML
0.5 INJECTION, SOLUTION EPIDURAL; INFILTRATION; INTRACAUDAL; PERINEURAL ONCE AS NEEDED
Status: COMPLETED | OUTPATIENT
Start: 2023-06-01 | End: 2023-06-01

## 2023-06-01 RX ORDER — BUPIVACAINE HYDROCHLORIDE AND EPINEPHRINE 5; 5 MG/ML; UG/ML
INJECTION, SOLUTION PERINEURAL AS NEEDED
Status: DISCONTINUED | OUTPATIENT
Start: 2023-06-01 | End: 2023-06-01 | Stop reason: HOSPADM

## 2023-06-01 RX ORDER — ONDANSETRON 4 MG/1
4 TABLET, FILM COATED ORAL EVERY 6 HOURS PRN
Status: DISCONTINUED | OUTPATIENT
Start: 2023-06-01 | End: 2023-06-01 | Stop reason: HOSPADM

## 2023-06-01 RX ORDER — MAGNESIUM HYDROXIDE 1200 MG/15ML
LIQUID ORAL AS NEEDED
Status: DISCONTINUED | OUTPATIENT
Start: 2023-06-01 | End: 2023-06-01 | Stop reason: HOSPADM

## 2023-06-01 RX ORDER — LABETALOL HYDROCHLORIDE 5 MG/ML
5 INJECTION, SOLUTION INTRAVENOUS
Status: DISCONTINUED | OUTPATIENT
Start: 2023-06-01 | End: 2023-06-01 | Stop reason: HOSPADM

## 2023-06-01 RX ORDER — IBUPROFEN 600 MG/1
600 TABLET ORAL EVERY 6 HOURS PRN
Qty: 30 TABLET | Refills: 0 | Status: SHIPPED | OUTPATIENT
Start: 2023-06-01

## 2023-06-01 RX ORDER — SODIUM CHLORIDE 9 MG/ML
INJECTION, SOLUTION INTRAVENOUS AS NEEDED
Status: DISCONTINUED | OUTPATIENT
Start: 2023-06-01 | End: 2023-06-01 | Stop reason: HOSPADM

## 2023-06-01 RX ORDER — ACETAMINOPHEN 325 MG/1
650 TABLET ORAL EVERY 6 HOURS PRN
Status: DISCONTINUED | OUTPATIENT
Start: 2023-06-01 | End: 2023-06-01 | Stop reason: HOSPADM

## 2023-06-01 RX ORDER — MIDAZOLAM HYDROCHLORIDE 1 MG/ML
1 INJECTION INTRAMUSCULAR; INTRAVENOUS
Status: DISCONTINUED | OUTPATIENT
Start: 2023-06-01 | End: 2023-06-01 | Stop reason: HOSPADM

## 2023-06-01 RX ORDER — HEPARIN SODIUM 5000 [USP'U]/ML
5000 INJECTION, SOLUTION INTRAVENOUS; SUBCUTANEOUS ONCE
Status: COMPLETED | OUTPATIENT
Start: 2023-06-01 | End: 2023-06-01

## 2023-06-01 RX ORDER — FAMOTIDINE 20 MG/1
20 TABLET, FILM COATED ORAL ONCE
Status: COMPLETED | OUTPATIENT
Start: 2023-06-01 | End: 2023-06-01

## 2023-06-01 RX ORDER — DROPERIDOL 2.5 MG/ML
0.62 INJECTION, SOLUTION INTRAMUSCULAR; INTRAVENOUS ONCE AS NEEDED
Status: DISCONTINUED | OUTPATIENT
Start: 2023-06-01 | End: 2023-06-01 | Stop reason: HOSPADM

## 2023-06-01 RX ORDER — ONDANSETRON 2 MG/ML
INJECTION INTRAMUSCULAR; INTRAVENOUS AS NEEDED
Status: DISCONTINUED | OUTPATIENT
Start: 2023-06-01 | End: 2023-06-01 | Stop reason: SURG

## 2023-06-01 RX ORDER — ACETAMINOPHEN 325 MG/1
650 TABLET ORAL EVERY 6 HOURS PRN
Qty: 30 TABLET | Refills: 0 | Status: SHIPPED | OUTPATIENT
Start: 2023-06-01

## 2023-06-01 RX ORDER — SCOLOPAMINE TRANSDERMAL SYSTEM 1 MG/1
1 PATCH, EXTENDED RELEASE TRANSDERMAL CONTINUOUS
Status: DISCONTINUED | OUTPATIENT
Start: 2023-06-01 | End: 2023-06-01 | Stop reason: SDUPTHER

## 2023-06-01 RX ORDER — SODIUM CHLORIDE 0.9 % (FLUSH) 0.9 %
10 SYRINGE (ML) INJECTION EVERY 12 HOURS SCHEDULED
Status: DISCONTINUED | OUTPATIENT
Start: 2023-06-01 | End: 2023-06-01 | Stop reason: HOSPADM

## 2023-06-01 RX ORDER — FAMOTIDINE 10 MG/ML
20 INJECTION, SOLUTION INTRAVENOUS ONCE
Status: CANCELLED | OUTPATIENT
Start: 2023-06-01 | End: 2023-06-01

## 2023-06-01 RX ORDER — FENTANYL CITRATE 50 UG/ML
INJECTION, SOLUTION INTRAMUSCULAR; INTRAVENOUS
Status: COMPLETED
Start: 2023-06-01 | End: 2023-06-01

## 2023-06-01 RX ORDER — FENTANYL CITRATE 50 UG/ML
50 INJECTION, SOLUTION INTRAMUSCULAR; INTRAVENOUS
Status: DISCONTINUED | OUTPATIENT
Start: 2023-06-01 | End: 2023-06-01 | Stop reason: HOSPADM

## 2023-06-01 RX ORDER — OXYCODONE HYDROCHLORIDE 5 MG/1
5 TABLET ORAL ONCE
Status: COMPLETED | OUTPATIENT
Start: 2023-06-01 | End: 2023-06-01

## 2023-06-01 RX ORDER — SCOLOPAMINE TRANSDERMAL SYSTEM 1 MG/1
1 PATCH, EXTENDED RELEASE TRANSDERMAL CONTINUOUS
Status: DISCONTINUED | OUTPATIENT
Start: 2023-06-01 | End: 2023-06-01 | Stop reason: HOSPADM

## 2023-06-01 RX ORDER — FENTANYL CITRATE 50 UG/ML
50 INJECTION, SOLUTION INTRAMUSCULAR; INTRAVENOUS
Status: DISCONTINUED | OUTPATIENT
Start: 2023-06-01 | End: 2023-06-01 | Stop reason: SDUPTHER

## 2023-06-01 RX ORDER — ONDANSETRON 4 MG/1
4 TABLET, FILM COATED ORAL EVERY 6 HOURS PRN
Qty: 10 TABLET | Refills: 0 | Status: SHIPPED | OUTPATIENT
Start: 2023-06-01

## 2023-06-01 RX ORDER — ONDANSETRON 2 MG/ML
4 INJECTION INTRAMUSCULAR; INTRAVENOUS EVERY 6 HOURS PRN
Status: DISCONTINUED | OUTPATIENT
Start: 2023-06-01 | End: 2023-06-01 | Stop reason: HOSPADM

## 2023-06-01 RX ORDER — PSEUDOEPHEDRINE HCL 30 MG
1 TABLET ORAL 2 TIMES DAILY PRN
Qty: 60 CAPSULE | Refills: 3 | Status: SHIPPED | OUTPATIENT
Start: 2023-06-01

## 2023-06-01 RX ORDER — INDOCYANINE GREEN AND WATER 25 MG
KIT INJECTION AS NEEDED
Status: DISCONTINUED | OUTPATIENT
Start: 2023-06-01 | End: 2023-06-01 | Stop reason: HOSPADM

## 2023-06-01 RX ORDER — SODIUM CHLORIDE 0.9 % (FLUSH) 0.9 %
10 SYRINGE (ML) INJECTION AS NEEDED
Status: DISCONTINUED | OUTPATIENT
Start: 2023-06-01 | End: 2023-06-01 | Stop reason: HOSPADM

## 2023-06-01 RX ORDER — LIDOCAINE HYDROCHLORIDE 10 MG/ML
INJECTION, SOLUTION EPIDURAL; INFILTRATION; INTRACAUDAL; PERINEURAL AS NEEDED
Status: DISCONTINUED | OUTPATIENT
Start: 2023-06-01 | End: 2023-06-01 | Stop reason: SURG

## 2023-06-01 RX ORDER — CEFAZOLIN SODIUM IN 0.9 % NACL 3 G/100 ML
3 INTRAVENOUS SOLUTION, PIGGYBACK (ML) INTRAVENOUS ONCE
Status: COMPLETED | OUTPATIENT
Start: 2023-06-01 | End: 2023-06-01

## 2023-06-01 RX ORDER — ROCURONIUM BROMIDE 10 MG/ML
INJECTION, SOLUTION INTRAVENOUS AS NEEDED
Status: DISCONTINUED | OUTPATIENT
Start: 2023-06-01 | End: 2023-06-01 | Stop reason: SURG

## 2023-06-01 RX ORDER — SODIUM CHLORIDE, SODIUM LACTATE, POTASSIUM CHLORIDE, CALCIUM CHLORIDE 600; 310; 30; 20 MG/100ML; MG/100ML; MG/100ML; MG/100ML
9 INJECTION, SOLUTION INTRAVENOUS CONTINUOUS
Status: DISCONTINUED | OUTPATIENT
Start: 2023-06-01 | End: 2023-06-01 | Stop reason: HOSPADM

## 2023-06-01 RX ORDER — ALBUTEROL SULFATE 2.5 MG/3ML
2.5 SOLUTION RESPIRATORY (INHALATION) ONCE AS NEEDED
Status: DISCONTINUED | OUTPATIENT
Start: 2023-06-01 | End: 2023-06-01 | Stop reason: HOSPADM

## 2023-06-01 RX ORDER — IBUPROFEN 600 MG/1
600 TABLET ORAL EVERY 6 HOURS PRN
Status: DISCONTINUED | OUTPATIENT
Start: 2023-06-01 | End: 2023-06-01 | Stop reason: HOSPADM

## 2023-06-01 RX ORDER — OXYCODONE HYDROCHLORIDE 5 MG/1
5 TABLET ORAL EVERY 4 HOURS PRN
Status: DISCONTINUED | OUTPATIENT
Start: 2023-06-01 | End: 2023-06-01 | Stop reason: HOSPADM

## 2023-06-01 RX ORDER — SUCCINYLCHOLINE/SOD CL,ISO/PF 200MG/10ML
SYRINGE (ML) INTRAVENOUS AS NEEDED
Status: DISCONTINUED | OUTPATIENT
Start: 2023-06-01 | End: 2023-06-01 | Stop reason: SURG

## 2023-06-01 RX ORDER — DOCUSATE SODIUM 100 MG/1
200 CAPSULE, LIQUID FILLED ORAL 2 TIMES DAILY PRN
Status: DISCONTINUED | OUTPATIENT
Start: 2023-06-01 | End: 2023-06-01 | Stop reason: HOSPADM

## 2023-06-01 RX ORDER — SODIUM CHLORIDE 9 MG/ML
40 INJECTION, SOLUTION INTRAVENOUS AS NEEDED
Status: DISCONTINUED | OUTPATIENT
Start: 2023-06-01 | End: 2023-06-01 | Stop reason: HOSPADM

## 2023-06-01 RX ADMIN — FENTANYL CITRATE 50 MCG: 50 INJECTION, SOLUTION INTRAMUSCULAR; INTRAVENOUS at 16:15

## 2023-06-01 RX ADMIN — Medication 0.5 MG: at 15:29

## 2023-06-01 RX ADMIN — ACETAMINOPHEN 1000 MG: 500 TABLET ORAL at 10:19

## 2023-06-01 RX ADMIN — SODIUM CHLORIDE, POTASSIUM CHLORIDE, SODIUM LACTATE AND CALCIUM CHLORIDE 9 ML/HR: 600; 310; 30; 20 INJECTION, SOLUTION INTRAVENOUS at 10:21

## 2023-06-01 RX ADMIN — Medication 200 MG: at 13:06

## 2023-06-01 RX ADMIN — ROCURONIUM BROMIDE 100 MG: 10 SOLUTION INTRAVENOUS at 13:19

## 2023-06-01 RX ADMIN — ONDANSETRON 4 MG: 2 INJECTION INTRAMUSCULAR; INTRAVENOUS at 14:46

## 2023-06-01 RX ADMIN — FAMOTIDINE 20 MG: 20 TABLET ORAL at 10:20

## 2023-06-01 RX ADMIN — FENTANYL CITRATE 50 MCG: 50 INJECTION, SOLUTION INTRAMUSCULAR; INTRAVENOUS at 15:16

## 2023-06-01 RX ADMIN — PREGABALIN 150 MG: 150 CAPSULE ORAL at 10:20

## 2023-06-01 RX ADMIN — LIDOCAINE HYDROCHLORIDE 0.5 ML: 10 INJECTION, SOLUTION EPIDURAL; INFILTRATION; INTRACAUDAL; PERINEURAL at 10:21

## 2023-06-01 RX ADMIN — SCOPOLAMINE 1 PATCH: 1.5 PATCH, EXTENDED RELEASE TRANSDERMAL at 10:20

## 2023-06-01 RX ADMIN — PROPOFOL 300 MG: 10 INJECTION, EMULSION INTRAVENOUS at 13:06

## 2023-06-01 RX ADMIN — DEXAMETHASONE SODIUM PHOSPHATE 8 MG: 4 INJECTION INTRA-ARTICULAR; INTRALESIONAL; INTRAMUSCULAR; INTRAVENOUS; SOFT TISSUE at 13:16

## 2023-06-01 RX ADMIN — SUGAMMADEX 400 MG: 100 INJECTION, SOLUTION INTRAVENOUS at 14:52

## 2023-06-01 RX ADMIN — CEFAZOLIN 3 G: 10 INJECTION, POWDER, FOR SOLUTION INTRAVENOUS at 13:09

## 2023-06-01 RX ADMIN — ONDANSETRON 4 MG: 2 INJECTION INTRAMUSCULAR; INTRAVENOUS at 12:44

## 2023-06-01 RX ADMIN — LIDOCAINE HYDROCHLORIDE 50 MG: 10 INJECTION, SOLUTION EPIDURAL; INFILTRATION; INTRACAUDAL; PERINEURAL at 13:06

## 2023-06-01 RX ADMIN — HEPARIN SODIUM 5000 UNITS: 5000 INJECTION INTRAVENOUS; SUBCUTANEOUS at 10:20

## 2023-06-01 RX ADMIN — OXYCODONE 5 MG: 5 TABLET ORAL at 10:20

## 2023-06-01 RX ADMIN — HYDROMORPHONE HYDROCHLORIDE 0.5 MG: 1 INJECTION, SOLUTION INTRAMUSCULAR; INTRAVENOUS; SUBCUTANEOUS at 15:29

## 2023-06-01 RX ADMIN — CELECOXIB 200 MG: 200 CAPSULE ORAL at 10:20

## 2023-06-01 NOTE — ANESTHESIA PROCEDURE NOTES
Airway  Urgency: elective    Date/Time: 6/1/2023 1:07 PM  Airway not difficult    General Information and Staff    Patient location during procedure: OR  CRNA/CAA: Víctor Burton CRNA    Indications and Patient Condition  Indications for airway management: airway protection    Preoxygenated: yes  MILS not maintained throughout  Mask difficulty assessment: 1 - vent by mask    Final Airway Details  Final airway type: endotracheal airway      Successful airway: ETT  Cuffed: yes   Successful intubation technique: video laryngoscopy  Facilitating devices/methods: intubating stylet  Endotracheal tube insertion site: oral  Blade: Huerta  Blade size: 3  ETT size (mm): 7.5  Cormack-Lehane Classification: grade I - full view of glottis  Placement verified by: chest auscultation and capnometry   Cuff volume (mL): 10  Measured from: lips  ETT/EBT  to lips (cm): 21  Number of attempts at approach: 1  Assessment: lips, teeth, and gum same as pre-op and atraumatic intubation    Additional Comments  Negative epigastric sounds, Breath sound equal bilaterally with symmetric chest rise and fall

## 2023-06-01 NOTE — OP NOTE
Subjective     Date of Service:  06/01/23  Time of Service:  14:51 EDT    Surgical Staff: Surgeon(s) and Role:     * Keisha Croft MD - Primary   Additional Staff:   Assistant: Shaji Goyal PA-C  was responsible for performing the following activities: Retraction, Suction, Irrigation, Closing and Placing Dressing and their skilled assistance was necessary for the success of this case.     Pre-operative diagnosis(es): Pre-Op Diagnosis Codes:     * Endometrial cancer [C54.1]  BMI 50.88   Post-operative diagnosis(es): Post-Op Diagnosis Codes:     * Endometrial cancer [C54.1]  BMI 50.88   Procedure(s): Procedure(s):  TOTAL LAPAROSCOPIC HYSTERECTOMY BILATERAL SALPINGOOPHORECTOMY, PELVIC SENTINEL NODE DISSECTION WITH DAVINCI ROBOT, INJECTION OF ICG DYE     Antibiotics: cefazolin (Ancef) ordered on call to OR     Anesthesia: Type: General  ASA:  III     Objective      Operative findings:  At the time of placement of the vaginal instruments, there was significant pelvic organ prolapse noted.  At the time of laparoscopy, sentinel lymph nodes mapped to the obturator and external iliac lymph node groups bilaterally.  Uterus was enlarged.  Adnexa were unremarkable.  There is no obvious cancer outside of the uterus.  [place]ledge lymph nodes did not map to periaortic lymph node areas.  Final weight of the uterus was 272 g once removed.   Specimens removed: ID Type Source Tests Collected by Time   A (Not marked as sent) : LEFT PELVIC SENTINEL LYMPH NODE Tissue [place] Lymph Node TISSUE PATHOLOGY EXAM Keisha Croft MD 6/1/2023 1354   B (Not marked as sent) : RIGHT PELVIC SENTINEL LYMPH NODE  Tissue [place] Lymph Node TISSUE PATHOLOGY EXAM Keisha Croft MD 6/1/2023 1407   C (Not marked as sent) :  Tissue Uterus with Cervix, Bilateral Tubes and Ovaries TISSUE PATHOLOGY EXAM Keisha Croft MD 6/1/2023 1428      Fluid Intake and Output: I/O this shift:  In: -   Out: 150 [Urine:100; Blood:50]   Blood products  used: No   Drains: [REMOVED] Urethral Catheter Silicone 16 Fr. (Removed)      Implant Information: Nothing was implanted during the procedure   Complications:  No immediate   Intraoperative consult(s):    Condition: stable   Disposition: to PACU and then possible discharge home       Indications:  Patient is a pleasant 57-year-old woman who was diagnosed with a grade 1 endometrioid adenocarcinoma.  Risks and benefits of surgery were discussed.  Consent was signed and on chart.    Procedure: After obtaining informed consent, the patient was taken to the operating room and underwent general endotracheal anesthesia after patient and site verification. The feet were placed in Grzegorz stirrups. The arms were tucked at the sides. Steep Trendelenburg positioning was tested and found to be adequate. The abdomen, perineum, and vagina were prepped and draped in the usual sterile fashion. Alba catheter was anchored. Retractors were used to visualize the cervix.  Cervix was injected at 3 and 9:00 with ICG dye in the standard fashion.  Cervix was sounded and the appropriate uterine manipulator was called for.  Uterine manipulator was secured with out difficulty.  Attention was turned to the abdomen. Prior to each incision, skin was injected with 0.5% Marcaine with epinephrine.  Varies needle was inserted at the umbilicus and the abdomen was insufflated to pressure 15 mmHg with CO2 gas.  An 8 mm trocar was inserted at the umbilical midline position without difficulty.  Laparoscope was introduced to confirm positioning. Steep Trendelenburg was called for.  2 left-sided 8 mm and 2 right-sided 8 mm trochars were all placed under direct visualization.  The above findings were noted.  Da Tyler robot was docked to the patient and surgeon retired to the operating console.    The peritoneum overlying the pelvic sidewalls was divided with monopolar scissors. Infundibulopelvic ligaments were isolated from surrounding structures and pedicles  were created using bipolar cautery and scissors. Likewise, the round ligaments were divided. Anterior and posterior leaves of the broad ligament were divided with monopolar scissors. A bladder flap was developed.  Uterine vessels were isolated. Pedicles were created at the level of the uterine vessels using bipolar cautery and scissors. Cardinal ligament and uterosacral ligament complexes were divided in a similar fashion. Monopolar scissors were used to perform a circumferential colpotomy and the specimen was handed off intact via the vagina for permanent pathology.     Bilateral pelvic,common, and periaortic sentinel lymph node dissection was performed according to the usual anatomic landmarks including the aorta and its bifurcation, the common iliac arteries and their bifurcations, the external and internal iliac arteries, the obturator and genitofemoral nerves, and the umbilical ligament. Pelvic lymph nodes were placed in Endo Catch bags and removed from the field.    The vaginal cuff was closed with 0 Vicryl suture in a running locking fashion x2 layers. Good hemostasis was noted. Additional hemostasis was achieved at the pelvis as needed using bipolar cautery. Da Tyler robot was undocked from the patient and the surgeon returned to the bedside.  Trochars were removed under direct visualization.  CO2 gas was allowed to escape from the abdominal cavity. At that point, no fascial defects could be palpated.  The skin was closed with 3-0 Monocryl in subcuticular stitch and glue was placed at the skin. The vagina was inspected.  Occluder and all instruments had been removed from the vagina.  Good hemostasis was noted at the vagina.  Bladder was back filled with 120 mL of sterile solution and the desir was discontinued.  The patient was taken to the recovery room in good condition. There were no immediate complications. All counts were correct.          Keisha Croft MD  06/01/23  14:51 EDT

## 2023-06-01 NOTE — ANESTHESIA POSTPROCEDURE EVALUATION
Patient: Lorrie Espinosa    Procedure Summary     Date: 06/01/23 Room / Location:  [facility] OR  /  [facility] OR    Anesthesia Start: 1259 Anesthesia Stop: 1508    Procedure: TOTAL LAPAROSCOPIC HYSTERECTOMY BILATERAL SALPINGOOPHORECTOMY, PELVIC SENTINEL NODE DISSECTION WITH DAVINCI ROBOT, INJECTION OF ICG DYE (Bilateral) Diagnosis:       Endometrial cancer      (Endometrial cancer [C54.1])    Surgeons: Keisha Croft MD Provider: Niall Yao Jr., MD    Anesthesia Type: general ASA Status: 3          Anesthesia Type: general    Vitals  No vitals data found for the desired time range.          Post Anesthesia Care and Evaluation    Patient location during evaluation: PACU  Patient participation: complete - patient participated  Level of consciousness: awake  Pain score: 0  Pain management: adequate    Airway patency: patent  Anesthetic complications: No anesthetic complications  PONV Status: none  Cardiovascular status: acceptable and stable  Respiratory status: nasal cannula, unassisted, acceptable and spontaneous ventilation  Hydration status: acceptable

## 2023-06-01 NOTE — ANESTHESIA PREPROCEDURE EVALUATION
Anesthesia Evaluation     Patient summary reviewed and Nursing notes reviewed   history of anesthetic complications: PONV  NPO Solid Status: > 8 hours  NPO Liquid Status: > 8 hours           Airway   Mallampati: II  TM distance: >3 FB  Neck ROM: full  Large neck circumference and Possible difficult intubation  Dental - normal exam         Pulmonary - normal exam   (-) asthma, sleep apnea, not a smoker, no home oxygen  Cardiovascular - normal exam    ECG reviewed  Patient on routine beta blocker and Beta blocker given within 24 hours of surgery    (+) hypertension, hyperlipidemia,   (-) dysrhythmias, angina, cardiac stents, DVT    ROS comment: SB rate 59 iRBBB    11/2020-lvef 61-65, mild AI, mild mr, no ms, mild tr, rvsp 35-45, no effusion.    Neuro/Psych  (-) seizures, CVA  GI/Hepatic/Renal/Endo    (+) morbid obesity,    (-) GERD, no renal disease, diabetes, no thyroid disorder    Musculoskeletal     Abdominal    Substance History - negative use     OB/GYN          Other      history of cancer (endometrial ca)    ROS/Med Hx Other: hgb 13.7 k 3.8                   Anesthesia Plan    ASA 3     general     (Risks and benefits of general anesthesia discussed with patient (including MI, CVA, death, recall, aspiration, oropharyngeal/dental damage), questions answered, agreeable to proceed.  Discussed elevated risk of dental damage to loose upper tooth given preexisiting state.  Questions answered.        Scop patch per patient request)  intravenous induction     Anesthetic plan, risks, benefits, and alternatives have been provided, discussed and informed consent has been obtained with: patient.    Use of blood products discussed with patient  Consented to blood products.   Plan discussed with CRNA.        CODE STATUS:

## 2023-06-05 LAB
CYTO UR: NORMAL
LAB AP CASE REPORT: NORMAL
LAB AP CLINICAL INFORMATION: NORMAL
LAB AP DIAGNOSIS COMMENT: NORMAL
PATH REPORT.FINAL DX SPEC: NORMAL
PATH REPORT.GROSS SPEC: NORMAL

## 2023-06-06 ENCOUNTER — TELEPHONE (OUTPATIENT)
Dept: GYNECOLOGIC ONCOLOGY | Facility: CLINIC | Age: 58
End: 2023-06-06
Payer: COMMERCIAL

## 2023-06-06 NOTE — TELEPHONE ENCOUNTER
RN called patient and communicated results. Patient verbalized understanding, confirmed f/u appt 6/21.

## 2023-06-06 NOTE — TELEPHONE ENCOUNTER
Received surgery confirmation request, completed and faxed to provided number.  Form placed with earlier paperwork in scan folder for addition to patient chart

## 2023-06-06 NOTE — TELEPHONE ENCOUNTER
Caller: CYRIL      Best call back number:[phone]    What is the best time to reach you: ANYTIME    Who are you requesting to speak with (clinical staff, provider,  specific staff member): CLINICAL     Do you know the name of the person who called: TREVER     What was the call regarding: TREVER CALLING FROM [employer] REGARDING THE FMLA PAPERWORK THAT WAS SENT IN ON PATIENT. PLEASE CALL TO DISCUSS

## 2023-06-06 NOTE — TELEPHONE ENCOUNTER
----- Message from Keisha Croft MD sent at 6/6/2023 10:08 AM EDT -----  Please notify patient that final pathology showed early endometrial cancer.  No metastatic disease.  Plan for observation.  Thank you!      ----- Message -----  From: Lab, Background User  Sent: 6/5/2023   3:52 PM EDT  To: Keisha Croft MD

## 2023-06-06 NOTE — TELEPHONE ENCOUNTER
Received, completed, and faxed Health care provider medical certification paper work and placed into scan folder for addition to medical record.  Patient notified of completion and send.

## 2023-06-12 ENCOUNTER — TELEPHONE (OUTPATIENT)
Dept: GYNECOLOGIC ONCOLOGY | Facility: CLINIC | Age: 58
End: 2023-06-12
Payer: COMMERCIAL

## 2023-06-12 NOTE — TELEPHONE ENCOUNTER
Caller: Lorrie Espinosa    Relationship: Self    Best call back number: [phone]    What is the best time to reach you: ANYTIME    Who are you requesting to speak with (clinical staff, provider,  specific staff member): CLINICAL    What was the call regarding: PT STATES HER FMLA PAPERWORK HAS THE WRONG RETURN DATE, PLEASE CALL BACK TO ADVISE.SHE SAID IT STATES 6-     Is it okay if the provider responds through [employer]: PLEASE CALL        
Patient call returned, Paperwork corrected and refaxed to provided number.  
No

## 2023-06-20 PROBLEM — Z98.890 POST-OPERATIVE STATE: Status: ACTIVE | Noted: 2023-06-20

## 2023-09-26 ENCOUNTER — OFFICE VISIT (OUTPATIENT)
Dept: GYNECOLOGIC ONCOLOGY | Facility: CLINIC | Age: 58
End: 2023-09-26
Payer: COMMERCIAL

## 2023-09-26 VITALS
OXYGEN SATURATION: 96 % | SYSTOLIC BLOOD PRESSURE: 167 MMHG | BODY MASS INDEX: 47.09 KG/M2 | TEMPERATURE: 97.5 F | WEIGHT: 293 LBS | DIASTOLIC BLOOD PRESSURE: 81 MMHG | RESPIRATION RATE: 17 BRPM | HEART RATE: 65 BPM | HEIGHT: 66 IN

## 2023-09-26 DIAGNOSIS — C54.1 ENDOMETRIAL CANCER: Primary | ICD-10-CM

## 2023-09-26 PROBLEM — R01.1 HEART MURMUR: Status: ACTIVE | Noted: 2023-09-26

## 2023-09-26 PROBLEM — R73.9 HYPERGLYCEMIA: Status: ACTIVE | Noted: 2023-09-26

## 2023-09-26 PROBLEM — E55.9 VITAMIN D DEFICIENCY: Status: ACTIVE | Noted: 2023-09-26

## 2023-09-26 PROCEDURE — 99214 OFFICE O/P EST MOD 30 MIN: CPT | Performed by: NURSE PRACTITIONER

## 2023-09-26 NOTE — PROGRESS NOTES
GYN ONCOLOGY CANCER SURVIVORSHIP VISIT    Lorrie Espinosa  [id number]  1965    Subjective   Chief Complaint: Uterine Cancer (Survivorship)        History of present illness:     Lorrie Espinosa is a 58 y.o. year old female who is here today for her Cancer Survivorship visit, see oncology history below. She reports she is feeling very well today and has no complaints. She denies vaginal bleeding, pelvic pain, and changes in bowel or bladder function. She feels well recovered from surgery, no complications.      Cancer History:   Oncology/Hematology History   Endometrial cancer   4/24/2023 Initial Diagnosis    Hysteroscopy, D&C, and Myosure polypectomy by Dr. Cruz at [facility] due to worsening postmenopausal bleeding and endometrium thickened to 27 mm via TVUS despite previous negative work-up and progesterone management.   Uterus sounded to 10 cm and numerous endometrial polyps found. Pathology showed grade 1 endometrial adenocarcinoma.  Referred to Gyn Oncology     5/24/2023 Cancer Staged    Staging form: Corpus Uteri - Carcinoma And Carcinosarcoma, AJCC 8th Edition  - Clinical stage from 5/24/2023: FIGO Stage I (cT1, cN0, cM0) - Signed by Keisha Croft MD on 5/24/2023 6/1/2023 Surgery    Robotic-assisted total laparoscopic hysterectomy, bilateral salpingo-oophorectomy, and pelvic sentinel lymph node dissection with Dr. Keisha Croft at [facility].     Surgical pathology showed low-grade endometrioid carcinoma, minimally invasive into the myometrium.  Superficial, non-myoinvasive lower uterine segment involvement noted.  No lymphvascular invasion.  Nodes and all other structures negative. Molecular testing on initial pathology showed MMR intact, PD-L1 CPS <1, p53 wild-type, ER/OR positive.  Stage IA grade 1     6/1/2023 Cancer Staged    Staging form: Corpus Uteri - Carcinoma And Carcinosarcoma, AJCC 8th Edition  - Pathologic stage from 6/1/2023: FIGO Stage IA (pT1a, pN0(sn), cM0) - Signed by  "Keisha Croft MD on 6/20/2023         The current medication list and allergy list were reviewed and reconciled.     Past Medical History, Past Surgical History, Social History, Family History have been reviewed and are without significant changes except as mentioned.        Review of Systems   Constitutional: Negative.    Gastrointestinal: Negative.    Genitourinary: Negative.    Psychiatric/Behavioral: Negative.         Objective   Physical Exam  Vital Signs: /81   Pulse 65   Temp 97.5 °F (36.4 °C) (Temporal)   Resp 17   Ht 168.9 cm (66.5\")   Wt (!) 150 kg (329 lb 14.4 oz)   SpO2 96%   BMI 52.46 kg/m²   Vitals:    09/26/23 1302   PainSc: 0-No pain           General Appearance:  alert, cooperative, no apparent distress, appears stated age, and obese by BMI criteria   Neurologic/Psych: A&O x 3, gait steady, appropriate affect   Abdomen:   Soft, non-tender, non-distended, and no organomegaly   Lymph nodes: No cervical, supraclavicular, inguinal adenopathy noted   Extremities: Normal, atraumatic; no clubbing, cyanosis, or edema    Pelvic: External Genitalia  without lesions or skin changes  Vagina  is pink, moist, without lesions.   Vaginal Cuff  Female Vaginal Cuff: smooth, intact, and without visible lesions  Uterus  surgically absent, no palpable masses, and exam limited   Ovaries  surgically absent bilaterally  Parametria  smooth  Rectovaginal  Female rectovaginal: deferred     ECOG score: 0             Result Review :   The following data was reviewed by: SUMAN Chase on 09/26/2023:  Data reviewed : surgical pathology from hysterectomy        Survivorship Needs Assessment:  Nutrition Services  Health history, treatment course, and weight trends reviewed. Discussed nutrition services offered by [facility] including oncology nutrition, diabetes management, general outpatient nutrition, exercise counseling, and weight management. The patient declines need of referral to nutrition " services at this time.     PT/Rehab  Health history, treatment course, and current status. The patient is not in need of physical therapy or rehabilitation services.    Behavioral Health  A post-treatment depression screening has been completed. PHQ-9 Total Score: 0  PHQ-9 results show 0 (No Depression). The patient has not previously established mental health care. A referral is not recommended at this time.       Procedure Note:            Assessment and Plan:     Diagnoses and all orders for this visit:    1. Endometrial cancer (Primary)        There is no evidence of disease upon today's exam. Plan for every 6 month cancer surveillance visits for the first 2 years, then yearly until the 5 year chris. She is understanding to call with any changes in pelvic symptoms or general GYN concerns at any time between regularly scheduled visits.     The patient and I have reviewed her Survivorship Care Plan in detail. We discussed her diagnosis, pathology, histology, all treatments, and ongoing surveillance recommendations. All questions were answered to her satisfaction. She is in agreement with our plan for ongoing surveillance as outlined in her plan. A copy of this document was provided to her at the completion of our visit.  A copy has also been sent to her primary care provider.    Pain assessment was performed today as a part of patient’s care. For patients with pain related to surgery, gynecologic malignancy or cancer treatment, the plan is as noted in the assessment/plan.  For patients with pain not related to these issues, they are to seek any further needed care from a more appropriate provider, such as PCP.        I spent 30 minutes caring for Lorrie on this date of service. This time includes time spent by me in the following activities: preparing for the visit, reviewing tests, performing a medically appropriate examination and/or evaluation, counseling and educating the patient/family/caregiver, documenting  information in the medical record, and care coordination.       Follow Up   Return to clinic in 6 months for ongoing cancer surveillance.      Electronically signed by SUMAN Chase on 09/26/2023

## 2024-04-10 ENCOUNTER — OFFICE VISIT (OUTPATIENT)
Dept: GYNECOLOGIC ONCOLOGY | Facility: CLINIC | Age: 59
End: 2024-04-10
Payer: COMMERCIAL

## 2024-04-10 VITALS
TEMPERATURE: 97.4 F | SYSTOLIC BLOOD PRESSURE: 150 MMHG | OXYGEN SATURATION: 96 % | HEIGHT: 67 IN | HEART RATE: 72 BPM | DIASTOLIC BLOOD PRESSURE: 76 MMHG | WEIGHT: 293 LBS | BODY MASS INDEX: 45.99 KG/M2 | RESPIRATION RATE: 22 BRPM

## 2024-04-10 DIAGNOSIS — Z85.42 HISTORY OF ENDOMETRIAL CANCER: Primary | ICD-10-CM

## 2024-04-10 PROCEDURE — 99213 OFFICE O/P EST LOW 20 MIN: CPT | Performed by: NURSE PRACTITIONER

## 2024-04-10 RX ORDER — ROSUVASTATIN CALCIUM 20 MG/1
1 TABLET, COATED ORAL DAILY
COMMUNITY
Start: 2024-03-05

## 2024-04-10 NOTE — PROGRESS NOTES
GYN ONCOLOGY CANCER SURVEILLANCE FOLLOW-UP    Lorrie Espinosa  [id number]  1965    Subjective   Chief Complaint: follow up, h/o endometrial cancer      History of present illness:    Lorrie Espinosa is a 58 y.o. year old female who is here today for ongoing surveillance of Endometrial Cancer, see Cancer History. Completed surgical treatment 6/2023. Last seen in 9/2023 for survivorship visit. She reports continuing to feel well since last visit and has no acute complaints at this time. States she is hoping to not have to return for as frequent f/u vaginal exams after today. She denies vaginal bleeding, pelvic pain, and changes in bowel or bladder function. She feels well recovered from surgery, no complications.  Blood pressure noted to be above normal during visit today and I brought this up to the patient.  She is asymptomatic, and attributes elevation to rushing down to appointment from her work shift.  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 Cruz at [facility] due to worsening postmenopausal bleeding and endometrium thickened to 27 mm via TVUS despite previous negative work-up and progesterone management.   Uterus sounded to 10 cm and numerous endometrial polyps found. Pathology showed grade 1 endometrial adenocarcinoma.  Referred to Gyn Oncology     5/24/2023 Cancer Staged    Staging form: Corpus Uteri - Carcinoma And Carcinosarcoma, AJCC 8th Edition  - Clinical stage from 5/24/2023: FIGO Stage I (cT1, cN0, cM0) - Signed by Keisha Corft MD on 5/24/2023 6/1/2023 Surgery    Robotic-assisted total laparoscopic hysterectomy, bilateral salpingo-oophorectomy, and pelvic sentinel lymph node dissection with Dr. Keisha Croft at [facility].     Surgical pathology showed low-grade endometrioid carcinoma, minimally  "invasive into the myometrium.  Superficial, non-myoinvasive lower uterine segment involvement noted.  No lymphvascular invasion.  Nodes and all other structures negative. Molecular testing on initial pathology showed MMR intact, PD-L1 CPS <1, p53 wild-type, ER/WV positive.  Stage IA grade 1     6/1/2023 Cancer Staged    Staging form: Corpus Uteri - Carcinoma And Carcinosarcoma, AJCC 8th Edition  - Pathologic stage from 6/1/2023: FIGO Stage IA (pT1a, pN0(sn), cM0) - Signed by Keisha Croft MD on 6/20/2023           The current medication list and allergy list were reviewed and reconciled.     Past Medical History, Past Surgical History, Social History, Family History have been reviewed and are without significant changes except as mentioned.      Review of Systems   Constitutional: Negative.    Gastrointestinal: Negative.    Genitourinary: Negative.    Psychiatric/Behavioral: Negative.             Objective   Physical Exam  Vital Signs: /76   Pulse 72   Temp 97.4 °F (36.3 °C) (Temporal)   Resp 22   Ht 168.9 cm (66.5\")   Wt (!) 150 kg (331 lb)   LMP 04/30/2018   SpO2 96%   BMI 52.62 kg/m²   Vitals:    04/10/24 1437   PainSc: 0-No pain           General Appearance:  alert, cooperative, no apparent distress, appears stated age, and obese by BMI criteria   Neurologic/Psych: A&O x 3, gait steady, appropriate affect   HEENT:  Normocephalic, without obvious abnormality, mucous membranes moist   Abdomen:   Soft, non-tender, non-distended, no organomegaly, and Exam limited d/t habitus.   Lymph nodes: No cervical, supraclavicular, inguinal adenopathy noted   Pelvic: External Genitalia  without lesions or skin changes  Vagina  is pink, moist, without lesions.   Vaginal Cuff  Female Vaginal Cuff: smooth, intact, and without visible lesions  Uterus  surgically absent, no palpable masses-- exam limited   Ovaries  surgically absent bilaterally  Parametria  smooth  Rectovaginal  Female rectovaginal: deferred   " "    ECOG score: 1             Result Review :      The following data was reviewed by: SUMAN Rogel on 04/10/2024:  Common labs          5/24/2023    10:59 6/1/2023    10:13   Common Labs   Glucose 103     BUN 13     Creatinine 0.70     Sodium 139     Potassium 3.8  4.3    Chloride 101     Calcium 9.7     Albumin 4.3     Total Bilirubin 0.5     Alkaline Phosphatase 84     AST (SGOT) 20     ALT (SGPT) 14     WBC 8.98     Hemoglobin 13.7     Hematocrit 42.1     Platelets 313         Tumor marker:  No results found for: \"\"    PHQ-9 Total Score: 1    Procedure Note:            Assessment and Plan:      Diagnoses and all orders for this visit:    1. History of endometrial cancer (Primary)    There is no evidence of disease upon today's exam. Plan for every 6 month cancer surveillance visits for the first 2 years (6/2025), then yearly until the 5 year chris. She is understanding to call with any changes in pelvic symptoms or general GYN concerns at any time between regularly scheduled visits.           Pain assessment was performed today as a part of patient’s care.  For patients with pain related to surgery, gynecologic malignancy or cancer treatment, the plan is as noted in the assessment/plan.  For patients with pain not related to these issues, they are to seek any further needed care from a more appropriate provider, such as PCP.      Follow-up:    Return to clinic in 6 months for ongoing cancer surveillance.      Electronically signed by SUMAN Rogel on 04/10/2024        "

## 2024-09-11 ENCOUNTER — TRANSCRIBE ORDERS (OUTPATIENT)
Dept: ADMINISTRATIVE | Facility: HOSPITAL | Age: 59
End: 2024-09-11
Payer: COMMERCIAL

## 2024-09-11 DIAGNOSIS — Z12.31 VISIT FOR SCREENING MAMMOGRAM: Primary | ICD-10-CM

## 2024-09-17 ENCOUNTER — HOSPITAL ENCOUNTER (OUTPATIENT)
Facility: HOSPITAL | Age: 59
Discharge: HOME | End: 2024-09-17
Admitting: INTERNAL MEDICINE
Payer: COMMERCIAL

## 2024-09-17 DIAGNOSIS — Z12.31 VISIT FOR SCREENING MAMMOGRAM: ICD-10-CM

## 2024-09-17 PROCEDURE — 77063 BREAST TOMOSYNTHESIS BI: CPT | Performed by: RADIOLOGY

## 2024-09-17 PROCEDURE — 77063 BREAST TOMOSYNTHESIS BI: CPT

## 2024-09-17 PROCEDURE — 77067 SCR MAMMO BI INCL CAD: CPT | Performed by: RADIOLOGY

## 2024-09-17 PROCEDURE — 77067 SCR MAMMO BI INCL CAD: CPT

## 2024-10-25 ENCOUNTER — OFFICE VISIT (OUTPATIENT)
Dept: GYNECOLOGIC ONCOLOGY | Facility: CLINIC | Age: 59
End: 2024-10-25
Payer: COMMERCIAL

## 2024-10-25 VITALS
HEIGHT: 67 IN | RESPIRATION RATE: 20 BRPM | WEIGHT: 293 LBS | OXYGEN SATURATION: 97 % | HEART RATE: 74 BPM | SYSTOLIC BLOOD PRESSURE: 134 MMHG | DIASTOLIC BLOOD PRESSURE: 77 MMHG | BODY MASS INDEX: 45.99 KG/M2 | TEMPERATURE: 97.2 F

## 2024-10-25 DIAGNOSIS — Z85.42 HISTORY OF ENDOMETRIAL CANCER: Primary | ICD-10-CM

## 2024-10-25 NOTE — PROGRESS NOTES
GYN ONCOLOGY CANCER SURVEILLANCE FOLLOW-UP    Lorrie Espinosa  [id number]  1965    Subjective   Chief Complaint: follow up, h/o endometrial cancer       History of present illness:   Lorrie Espinosa is a 59 y.o. year old female who is here today for ongoing surveillance of Endometrial Cancer, see Cancer History.  She is now over 1 year out since completion of surgical treatment.Today, she is doing well. She denies vaginal bleeding and discharge. She does not have abdominal or pelvic pain. She is tolerating a regular diet and endorses a normal appetite. She denies nausea and vomiting. She denies early satiety and bloating. Denies any CP, SOB, lightheadedness or dizziness. She denies changes in her bowel/bladder. No dysuria, frequency, urgency, hematuria or flank pain. Reports normal energy levels.     Cancer History:   Oncology/Hematology History   Endometrial cancer   4/24/2023 Initial Diagnosis    Hysteroscopy, D&C, and Myosure polypectomy by Dr. Cruz at [facility] due to worsening postmenopausal bleeding and endometrium thickened to 27 mm via TVUS despite previous negative work-up and progesterone management.   Uterus sounded to 10 cm and numerous endometrial polyps found. Pathology showed grade 1 endometrial adenocarcinoma.  Referred to Gyn Oncology     5/24/2023 Cancer Staged    Staging form: Corpus Uteri - Carcinoma And Carcinosarcoma, AJCC 8th Edition  - Clinical stage from 5/24/2023: FIGO Stage I (cT1, cN0, cM0) - Signed by Keisha Croft MD on 5/24/2023 6/1/2023 Surgery    Robotic-assisted total laparoscopic hysterectomy, bilateral salpingo-oophorectomy, and pelvic sentinel lymph node dissection with Dr. Keisha Croft at [facility].     Surgical pathology showed low-grade endometrioid carcinoma, minimally invasive into the myometrium.  Superficial, non-myoinvasive lower uterine segment involvement noted.  No lymphvascular invasion.  Nodes and all other structures negative. Molecular  "testing on initial pathology showed MMR intact, PD-L1 CPS <1, p53 wild-type, ER/ME positive.  Stage IA grade 1     6/1/2023 Cancer Staged    Staging form: Corpus Uteri - Carcinoma And Carcinosarcoma, AJCC 8th Edition  - Pathologic stage from 6/1/2023: FIGO Stage IA (pT1a, pN0(sn), cM0) - Signed by Keisha Croft MD on 6/20/2023           The current medication list and allergy list were reviewed and reconciled.     Past Medical History, Past Surgical History, Social History, Family History have been reviewed and are without significant changes except as mentioned.        Objective   Physical Exam  Vital Signs: /77   Pulse 74   Temp 97.2 °F (36.2 °C) (Temporal)   Resp 20   Ht 168.9 cm (66.5\")   Wt (!) 147 kg (323 lb 6.4 oz)   LMP 04/30/2018   SpO2 97%   BMI 51.42 kg/m²   Vitals:    10/25/24 1021   PainSc: 0-No pain           General Appearance:  alert, cooperative, no apparent distress, appears stated age, and obese by BMI criteria   Neurologic/Psych: A&O x 3, gait steady, appropriate affect   HEENT:  Normocephalic, without obvious abnormality, mucous membranes moist   Abdomen:   Soft, non-tender, non-distended, no organomegaly, and Exam limited d/t habitus.   Lymph nodes: No cervical, supraclavicular, inguinal adenopathy noted   Pelvic: External Genitalia  without lesions or skin changes  Vagina  is pink, moist, without lesions.   Vaginal Cuff  Female Vaginal Cuff: smooth, intact, and without visible lesions  Uterus  surgically absent, no palpable masses-- exam limited   Ovaries  surgically absent bilaterally  Parametria  smooth  Rectovaginal  Female rectovaginal: deferred     ECOG score: 1             Result Review :{   Tumor marker:  No results found for: \"\"    PHQ-9 Total Score:      Procedure Note:            Assessment and Plan:     here is no evidence of disease upon today's exam. Plan for every 6 month cancer surveillance visits for the first 2 years (6/2025), then yearly until the 5 " year chris.  On return she will be 2 months shy of the 2-year chris and if appropriate, will start annual follow-up after that.  She is understanding to call with any changes in pelvic symptoms or general GYN concerns at any time between regularly scheduled visits.     Diagnoses and all orders for this visit:    1. History of endometrial cancer (Primary)      Pain assessment was performed today as a part of patient’s care.  For patients with pain related to surgery, gynecologic malignancy or cancer treatment, the plan is as noted in the assessment/plan.  For patients with pain not related to these issues, they are to seek any further needed care from a more appropriate provider, such as PCP.    Follow-up:     Return to clinic in 6 months for ongoing cancer surveillance.      Electronically signed by SUMAN Rogel on 10/25/2024

## 2025-03-12 ENCOUNTER — TRANSCRIBE ORDERS (OUTPATIENT)
Dept: ADMINISTRATIVE | Facility: HOSPITAL | Age: 60
End: 2025-03-12
Payer: COMMERCIAL

## 2025-03-12 DIAGNOSIS — R01.1 MURMUR: Primary | ICD-10-CM

## 2025-04-10 ENCOUNTER — HOSPITAL ENCOUNTER (OUTPATIENT)
Dept: CARDIOLOGY | Facility: HOSPITAL | Age: 60
Discharge: HOME | End: 2025-04-10
Admitting: INTERNAL MEDICINE
Payer: COMMERCIAL

## 2025-04-10 DIAGNOSIS — R01.1 MURMUR: ICD-10-CM

## 2025-04-10 LAB
AORTIC DIMENSIONLESS INDEX: 0.59 (DI)
AV MEAN PRESS GRAD SYS DOP V1V2: 7 MMHG
AV VMAX SYS DOP: 175 CM/SEC
BH CV ECHO MEAS - AO MAX PG: 12.3 MMHG
BH CV ECHO MEAS - AO ROOT DIAM: 3.1 CM
BH CV ECHO MEAS - AO V2 VTI: 39 CM
BH CV ECHO MEAS - AVA(I,D): 1.35 CM2
BH CV ECHO MEAS - EDV(CUBED): 140.6 ML
BH CV ECHO MEAS - EDV(MOD-SP4): 61.5 ML
BH CV ECHO MEAS - EF(MOD-SP4): 72.2 %
BH CV ECHO MEAS - ESV(CUBED): 64 ML
BH CV ECHO MEAS - ESV(MOD-SP4): 17.1 ML
BH CV ECHO MEAS - FS: 23.1 %
BH CV ECHO MEAS - IVS/LVPW: 1.31 CM
BH CV ECHO MEAS - IVSD: 1.4 CM
BH CV ECHO MEAS - LA DIMENSION: 3.8 CM
BH CV ECHO MEAS - LAT PEAK E' VEL: 9.6 CM/SEC
BH CV ECHO MEAS - LV DIASTOLIC VOL/BSA (35-75): 25.1 CM2
BH CV ECHO MEAS - LV MASS(C)D: 342.4 GRAMS
BH CV ECHO MEAS - LV MAX PG: 4 MMHG
BH CV ECHO MEAS - LV MEAN PG: 2 MMHG
BH CV ECHO MEAS - LV SYSTOLIC VOL/BSA (12-30): 7 CM2
BH CV ECHO MEAS - LV V1 MAX: 100 CM/SEC
BH CV ECHO MEAS - LV V1 VTI: 23.2 CM
BH CV ECHO MEAS - LVIDD: 5.2 CM
BH CV ECHO MEAS - LVIDS: 4 CM
BH CV ECHO MEAS - LVOT AREA: 2.27 CM2
BH CV ECHO MEAS - LVOT DIAM: 1.7 CM
BH CV ECHO MEAS - LVPWD: 1.3 CM
BH CV ECHO MEAS - MED PEAK E' VEL: 7.4 CM/SEC
BH CV ECHO MEAS - MV A MAX VEL: 103 CM/SEC
BH CV ECHO MEAS - MV E MAX VEL: 77.1 CM/SEC
BH CV ECHO MEAS - MV E/A: 0.75
BH CV ECHO MEAS - PA ACC TIME: 0.09 SEC
BH CV ECHO MEAS - RAP SYSTOLE: 10 MMHG
BH CV ECHO MEAS - RVSP: 42.5 MMHG
BH CV ECHO MEAS - SV(LVOT): 52.7 ML
BH CV ECHO MEAS - SV(MOD-SP4): 44.4 ML
BH CV ECHO MEAS - SVI(LVOT): 21.5 ML/M2
BH CV ECHO MEAS - SVI(MOD-SP4): 18.1 ML/M2
BH CV ECHO MEAS - TAPSE (>1.6): 2.35 CM
BH CV ECHO MEAS - TR MAX PG: 32.5 MMHG
BH CV ECHO MEAS - TR MAX VEL: 285 CM/SEC
BH CV ECHO MEASUREMENTS AVERAGE E/E' RATIO: 9.07
LEFT ATRIUM VOLUME INDEX: 17.5 ML/M2

## 2025-04-10 PROCEDURE — 93306 TTE W/DOPPLER COMPLETE: CPT

## 2025-04-25 ENCOUNTER — OFFICE VISIT (OUTPATIENT)
Dept: GYNECOLOGIC ONCOLOGY | Facility: CLINIC | Age: 60
End: 2025-04-25
Payer: COMMERCIAL

## 2025-04-25 VITALS
HEART RATE: 95 BPM | WEIGHT: 293 LBS | TEMPERATURE: 98.2 F | BODY MASS INDEX: 45.99 KG/M2 | HEIGHT: 67 IN | RESPIRATION RATE: 18 BRPM | OXYGEN SATURATION: 98 % | DIASTOLIC BLOOD PRESSURE: 72 MMHG | SYSTOLIC BLOOD PRESSURE: 140 MMHG

## 2025-04-25 DIAGNOSIS — Z85.42 HISTORY OF ENDOMETRIAL CANCER: Primary | ICD-10-CM

## 2025-04-25 NOTE — PROGRESS NOTES
GYN ONCOLOGY CANCER SURVEILLANCE FOLLOW-UP    Lorrie Espinosa  [id number]  1965    Subjective   Chief Complaint: Follow up (6m), endometrial cancer       History of present illness:   Lorrie Espinosa is a 59 y.o. year old female who is here today for ongoing surveillance of Endometrial Cancer, see Cancer History.  She is accompanied today by her .  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  She and her  are looking forward to taking some trips. She is approaching 2 years since completion of surgical treatment in June.Today, she is doing well. She denies vaginal bleeding and discharge. She does not have abdominal or pelvic pain. She is tolerating a regular diet and endorses a normal appetite. She denies nausea and vomiting. She denies early satiety and bloating. Denies any CP, SOB, lightheadedness or dizziness. She denies changes in her bowel/bladder. No dysuria, frequency, urgency, hematuria or flank pain. Reports normal energy levels.      Cancer History:   Oncology/Hematology History   Endometrial cancer   4/24/2023 Initial Diagnosis    Hysteroscopy, D&C, and Myosure polypectomy by Dr. Cruz at [facility] due to worsening postmenopausal bleeding and endometrium thickened to 27 mm via TVUS despite previous negative work-up and progesterone management.   Uterus sounded to 10 cm and numerous endometrial polyps found. Pathology showed grade 1 endometrial adenocarcinoma.  Referred to Gyn Oncology     5/24/2023 Cancer Staged    Staging form: Corpus Uteri - Carcinoma And Carcinosarcoma, AJCC 8th Edition  - Clinical stage from 5/24/2023: FIGO Stage I (cT1, cN0, cM0) - Signed by Keisha Corft MD on 5/24/2023 6/1/2023 Surgery    Robotic-assisted total laparoscopic hysterectomy, bilateral salpingo-oophorectomy, and pelvic sentinel lymph node dissection with Dr. Keisha Croft at [facility].  "    Surgical pathology showed low-grade endometrioid carcinoma, minimally invasive into the myometrium.  Superficial, non-myoinvasive lower uterine segment involvement noted.  No lymphvascular invasion.  Nodes and all other structures negative. Molecular testing on initial pathology showed MMR intact, PD-L1 CPS <1, p53 wild-type, ER/AL positive.  Stage IA grade 1     6/1/2023 Cancer Staged    Staging form: Corpus Uteri - Carcinoma And Carcinosarcoma, AJCC 8th Edition  - Pathologic stage from 6/1/2023: FIGO Stage IA (pT1a, pN0(sn), cM0) - Signed by Keisha Croft MD on 6/20/2023           The current medication list and allergy list were reviewed and reconciled.     Past Medical History, Past Surgical History, Social History, Family History have been reviewed and are without significant changes except as mentioned.      Review of Systems   Constitutional: Negative.    Gastrointestinal: Negative.    Genitourinary: Negative.    Psychiatric/Behavioral: Negative.             Objective   Physical Exam  Vital Signs: /72   Pulse 95   Temp 98.2 °F (36.8 °C) (Temporal)   Resp 18   Ht 168.9 cm (66.5\")   Wt (!) 147 kg (324 lb 11.2 oz)   LMP 04/30/2018   SpO2 98%   BMI 51.62 kg/m²   Vitals:    04/25/25 0958   PainSc: 0-No pain           General Appearance:  alert, cooperative, no apparent distress, appears stated age, and obese by BMI criteria   Neurologic/Psych: A&O x 3, gait steady, appropriate affect   HEENT:  Normocephalic, without obvious abnormality, mucous membranes moist   Abdomen:   Soft, non-tender, non-distended, no organomegaly, and Exam limited d/t habitus.   Lymph nodes: No cervical, supraclavicular, inguinal adenopathy noted   Pelvic: External genitalia are free from lesion.  On speculum examination, the vaginal cuff was intact and no lesions were appreciated.  On bimanual examination, no fullness was appreciated.  Uterus, cervix, and adnexa were absent. Exam limited due to habitus. There was no " significant tenderness.  The rectovaginal exam was deferred.       ECOG score: 0                         Assessment and Plan:     -Lorrie Espinosa is a 59 y.o. with a history of a stage IA grade 1 EAC endometrial cancer s/p TLH/BSO by Dr Croft on 6-1-23. She is currently without clinical evidence of disease. Signs of recurrent disease, such as vaginal bleeding, persistent abdominopelvic pain, urinary or bowel changes, and shortness of breath were reviewed.  She was advised to follow up immediately if she develops any of the above symptoms.  She is understanding to call with any changes in pelvic symptoms or general GYN concerns at any time between regularly scheduled visits.  Patient is very close to the 2-year chris since completion of treatment and continues to do very well.  We will plan to space out visits at this time from 6 to 12 months.  During visit today we again reviewed surveillance schedule including anticipated plan to follow her until the 5-year chris.  -She will follow-up in 1yr.    Health maintenance: She was reminded to maintain a healthy lifestyle with a well balanced diet, calcium and vitamin D for osteoporosis prevention, and exercise as well as continue with recommended health and cancer screening guidelines.      Diagnoses and all orders for this visit:    1. History of endometrial cancer (Primary)      Pain assessment was performed today as a part of patient’s care.  For patients with pain related to surgery, gynecologic malignancy or cancer treatment, the plan is as noted in the assessment/plan.  For patients with pain not related to these issues, they are to seek any further needed care from a more appropriate provider, such as PCP.    Follow-up:     Return to clinic in 1 year for ongoing cancer surveillance.      Electronically signed by SUMAN Rogel on 04/25/2025

## (undated) DEVICE — ADHS SKIN PREMIERPRO EXOFIN TOPICAL HI/VISC .5ML

## (undated) DEVICE — SYR LUERLOK 50ML

## (undated) DEVICE — GLV SURG BIOGEL LTX PF 6 1/2

## (undated) DEVICE — 3M™ DURAPORE™ SURGICAL TAPE 1538-3, 3 INCH X 10 YARD (7,5CM X 9,1M), 4 ROLLS/BOX: Brand: 3M™ DURAPORE™

## (undated) DEVICE — SPNG LAP PREWSH SFTPK 18X18IN STRL PK/5

## (undated) DEVICE — TIP COVER ACCESSORY

## (undated) DEVICE — PK MAJ TOTL HIP ANT 10

## (undated) DEVICE — SCRB SURG BACTOSHIELD CHG 4PCT 4OZ

## (undated) DEVICE — MANIP UTER RUMI TP 6.7MM 10CM GRN

## (undated) DEVICE — BNDG ELAS CO-FLEX SLF ADHR 4IN5YD LF STRL

## (undated) DEVICE — ANTIBACTERIAL UNDYED BRAIDED (POLYGLACTIN 910), SYNTHETIC ABSORBABLE SUTURE: Brand: COATED VICRYL

## (undated) DEVICE — PATIENT RETURN ELECTRODE, SINGLE-USE, CONTACT QUALITY MONITORING, ADULT, WITH 9FT CORD, FOR PATIENTS WEIGING OVER 33LBS. (15KG): Brand: MEGADYNE

## (undated) DEVICE — COVER,LIGHT HANDLE,FLX,1/PK: Brand: MEDLINE INDUSTRIES, INC.

## (undated) DEVICE — APPL CHLORAPREP TINTED 26ML TEAL

## (undated) DEVICE — COLUMN DRAPE

## (undated) DEVICE — ABDOMINAL BINDER: Brand: DEROYAL

## (undated) DEVICE — BLANKT WARM UPPR/BDY ARM/OUT 57X196CM

## (undated) DEVICE — ANTIBACTERIAL UNDYED BRAIDED (POLYGLACTIN 910), SYNTHETIC ABSORBABLE SURGICAL SUTURE: Brand: COATED VICRYL

## (undated) DEVICE — SPK10295 ORTHOPEDIC FRACTURE KIT: Brand: SPK10295 ORTHOPEDIC FRACTURE KIT

## (undated) DEVICE — CANNULA SEAL

## (undated) DEVICE — GLV SURG SENSICARE PI MIC PF SZ6.5 LF STRL

## (undated) DEVICE — REFLECTION SPHERICAL HEAD SCREW 25MM
Type: IMPLANTABLE DEVICE | Site: HIP | Status: NON-FUNCTIONAL
Brand: REFLECTION
Removed: 2020-02-12

## (undated) DEVICE — ST TBG CONN PNEUMOCLEAR EVAC SMOKE HEAT/HUMID

## (undated) DEVICE — SHEET, DRAPE, SPLIT, STERILE: Brand: MEDLINE

## (undated) DEVICE — PAD ARMBRD SURG CONVOL 7.5X20X2IN

## (undated) DEVICE — ARM DRAPE

## (undated) DEVICE — GLV SURG SIGNATURE TOUCH PF LTX 8 STRL BX/50

## (undated) DEVICE — BLADELESS OBTURATOR: Brand: WECK VISTA

## (undated) DEVICE — MANIP UTER RUMI 2 KOH EFFICIENT 4CM BL

## (undated) DEVICE — GLV SURG PREMIERPRO MIC LTX PF SZ8.5 BRN

## (undated) DEVICE — NDL BLNT 18G 1 1/2IN

## (undated) DEVICE — BOWL UTIL STRL 32OZ

## (undated) DEVICE — MANIP UTER RUMI TP 5.1MM 3.75CM

## (undated) DEVICE — 2, DISPOSABLE SUCTION/IRRIGATOR WITHOUT DISPOSABLE TIP: Brand: STRYKEFLOW

## (undated) DEVICE — PK MAJ GYN DAVINCI 10

## (undated) DEVICE — SUT MNCRYL PLS ANTIB UD 3/0 PS2 27IN

## (undated) DEVICE — STERILE PVP: Brand: MEDLINE INDUSTRIES, INC.